# Patient Record
Sex: FEMALE | Race: ASIAN | NOT HISPANIC OR LATINO
[De-identification: names, ages, dates, MRNs, and addresses within clinical notes are randomized per-mention and may not be internally consistent; named-entity substitution may affect disease eponyms.]

---

## 2019-05-06 ENCOUNTER — TRANSCRIPTION ENCOUNTER (OUTPATIENT)
Age: 34
End: 2019-05-06

## 2019-05-07 ENCOUNTER — INPATIENT (INPATIENT)
Facility: HOSPITAL | Age: 34
LOS: 2 days | Discharge: HOME | End: 2019-05-10
Attending: OBSTETRICS & GYNECOLOGY | Admitting: OBSTETRICS & GYNECOLOGY
Payer: COMMERCIAL

## 2019-05-07 VITALS — HEIGHT: 62 IN | WEIGHT: 121.25 LBS

## 2019-05-07 DIAGNOSIS — O26.899 OTHER SPECIFIED PREGNANCY RELATED CONDITIONS, UNSPECIFIED TRIMESTER: ICD-10-CM

## 2019-05-07 DIAGNOSIS — Z3A.00 WEEKS OF GESTATION OF PREGNANCY NOT SPECIFIED: ICD-10-CM

## 2019-05-07 DIAGNOSIS — Z34.80 ENCOUNTER FOR SUPERVISION OF OTHER NORMAL PREGNANCY, UNSPECIFIED TRIMESTER: ICD-10-CM

## 2019-05-07 LAB
BASOPHILS # BLD AUTO: 0.1 K/UL — SIGNIFICANT CHANGE UP (ref 0–0.2)
BASOPHILS NFR BLD AUTO: 0.7 % — SIGNIFICANT CHANGE UP (ref 0–2)
BLD GP AB SCN SERPL QL: NEGATIVE — SIGNIFICANT CHANGE UP
EOSINOPHIL # BLD AUTO: 0.1 K/UL — SIGNIFICANT CHANGE UP (ref 0–0.5)
EOSINOPHIL NFR BLD AUTO: 0.9 % — SIGNIFICANT CHANGE UP (ref 0–6)
HCT VFR BLD CALC: 15.3 % — CRITICAL LOW (ref 34.5–45)
HCT VFR BLD CALC: 35.6 % — SIGNIFICANT CHANGE UP (ref 34.5–45)
HGB BLD-MCNC: 12.4 G/DL — SIGNIFICANT CHANGE UP (ref 11.5–15.5)
HGB BLD-MCNC: 5.3 G/DL — CRITICAL LOW (ref 11.5–15.5)
LYMPHOCYTES # BLD AUTO: 18.5 % — SIGNIFICANT CHANGE UP (ref 13–44)
LYMPHOCYTES # BLD AUTO: 2.1 K/UL — SIGNIFICANT CHANGE UP (ref 1–3.3)
MCHC RBC-ENTMCNC: 29.7 PG — SIGNIFICANT CHANGE UP (ref 27–34)
MCHC RBC-ENTMCNC: 30.1 PG — SIGNIFICANT CHANGE UP (ref 27–34)
MCHC RBC-ENTMCNC: 34.5 GM/DL — SIGNIFICANT CHANGE UP (ref 32–36)
MCHC RBC-ENTMCNC: 34.8 GM/DL — SIGNIFICANT CHANGE UP (ref 32–36)
MCV RBC AUTO: 85.3 FL — SIGNIFICANT CHANGE UP (ref 80–100)
MCV RBC AUTO: 87.3 FL — SIGNIFICANT CHANGE UP (ref 80–100)
MONOCYTES # BLD AUTO: 0.5 K/UL — SIGNIFICANT CHANGE UP (ref 0–0.9)
MONOCYTES NFR BLD AUTO: 4 % — SIGNIFICANT CHANGE UP (ref 2–14)
NEUTROPHILS # BLD AUTO: 8.6 K/UL — HIGH (ref 1.8–7.4)
NEUTROPHILS NFR BLD AUTO: 76 % — SIGNIFICANT CHANGE UP (ref 43–77)
PLATELET # BLD AUTO: 135 K/UL — LOW (ref 150–400)
PLATELET # BLD AUTO: 276 K/UL — SIGNIFICANT CHANGE UP (ref 150–400)
RBC # BLD: 1.75 M/UL — LOW (ref 3.8–5.2)
RBC # BLD: 4.17 M/UL — SIGNIFICANT CHANGE UP (ref 3.8–5.2)
RBC # FLD: 11.5 % — SIGNIFICANT CHANGE UP (ref 10.3–14.5)
RBC # FLD: 11.7 % — SIGNIFICANT CHANGE UP (ref 10.3–14.5)
RH IG SCN BLD-IMP: POSITIVE — SIGNIFICANT CHANGE UP
RH IG SCN BLD-IMP: POSITIVE — SIGNIFICANT CHANGE UP
T PALLIDUM AB TITR SER: NEGATIVE — SIGNIFICANT CHANGE UP
WBC # BLD: 11.3 K/UL — HIGH (ref 3.8–10.5)
WBC # BLD: 7.3 K/UL — SIGNIFICANT CHANGE UP (ref 3.8–10.5)
WBC # FLD AUTO: 11.3 K/UL — HIGH (ref 3.8–10.5)
WBC # FLD AUTO: 7.3 K/UL — SIGNIFICANT CHANGE UP (ref 3.8–10.5)

## 2019-05-07 RX ORDER — LANOLIN
1 OINTMENT (GRAM) TOPICAL EVERY 6 HOURS
Qty: 0 | Refills: 0 | Status: DISCONTINUED | OUTPATIENT
Start: 2019-05-07 | End: 2019-05-10

## 2019-05-07 RX ORDER — GLYCERIN ADULT
1 SUPPOSITORY, RECTAL RECTAL AT BEDTIME
Qty: 0 | Refills: 0 | Status: DISCONTINUED | OUTPATIENT
Start: 2019-05-07 | End: 2019-05-10

## 2019-05-07 RX ORDER — OXYTOCIN 10 UNIT/ML
333.33 VIAL (ML) INJECTION
Qty: 20 | Refills: 0 | Status: DISCONTINUED | OUTPATIENT
Start: 2019-05-07 | End: 2019-05-10

## 2019-05-07 RX ORDER — ONDANSETRON 8 MG/1
4 TABLET, FILM COATED ORAL ONCE
Qty: 0 | Refills: 0 | Status: DISCONTINUED | OUTPATIENT
Start: 2019-05-07 | End: 2019-05-10

## 2019-05-07 RX ORDER — AMPICILLIN TRIHYDRATE 250 MG
CAPSULE ORAL
Qty: 0 | Refills: 0 | Status: DISCONTINUED | OUTPATIENT
Start: 2019-05-07 | End: 2019-05-07

## 2019-05-07 RX ORDER — DIPHENHYDRAMINE HCL 50 MG
25 CAPSULE ORAL EVERY 4 HOURS
Qty: 0 | Refills: 0 | Status: DISCONTINUED | OUTPATIENT
Start: 2019-05-07 | End: 2019-05-08

## 2019-05-07 RX ORDER — SIMETHICONE 80 MG/1
80 TABLET, CHEWABLE ORAL EVERY 4 HOURS
Qty: 0 | Refills: 0 | Status: DISCONTINUED | OUTPATIENT
Start: 2019-05-07 | End: 2019-05-10

## 2019-05-07 RX ORDER — SODIUM CHLORIDE 9 MG/ML
1000 INJECTION, SOLUTION INTRAVENOUS
Qty: 0 | Refills: 0 | Status: DISCONTINUED | OUTPATIENT
Start: 2019-05-07 | End: 2019-05-10

## 2019-05-07 RX ORDER — OXYCODONE HYDROCHLORIDE 5 MG/1
5 TABLET ORAL
Qty: 0 | Refills: 0 | Status: COMPLETED | OUTPATIENT
Start: 2019-05-07 | End: 2019-05-14

## 2019-05-07 RX ORDER — NALOXONE HYDROCHLORIDE 4 MG/.1ML
0.1 SPRAY NASAL
Qty: 0 | Refills: 0 | Status: DISCONTINUED | OUTPATIENT
Start: 2019-05-07 | End: 2019-05-08

## 2019-05-07 RX ORDER — GENTAMICIN SULFATE 40 MG/ML
250 VIAL (ML) INJECTION ONCE
Qty: 0 | Refills: 0 | Status: COMPLETED | OUTPATIENT
Start: 2019-05-07 | End: 2019-05-07

## 2019-05-07 RX ORDER — KETOROLAC TROMETHAMINE 30 MG/ML
30 SYRINGE (ML) INJECTION EVERY 6 HOURS
Qty: 0 | Refills: 0 | Status: DISCONTINUED | OUTPATIENT
Start: 2019-05-07 | End: 2019-05-09

## 2019-05-07 RX ORDER — HEPARIN SODIUM 5000 [USP'U]/ML
5000 INJECTION INTRAVENOUS; SUBCUTANEOUS EVERY 12 HOURS
Qty: 0 | Refills: 0 | Status: DISCONTINUED | OUTPATIENT
Start: 2019-05-08 | End: 2019-05-10

## 2019-05-07 RX ORDER — SODIUM CHLORIDE 9 MG/ML
1000 INJECTION, SOLUTION INTRAVENOUS
Qty: 0 | Refills: 0 | Status: DISCONTINUED | OUTPATIENT
Start: 2019-05-07 | End: 2019-05-07

## 2019-05-07 RX ORDER — FENTANYL/BUPIVACAINE/NS/PF 2MCG/ML-.1
5 PLASTIC BAG, INJECTION (ML) INJECTION
Qty: 0 | Refills: 0 | Status: DISCONTINUED | OUTPATIENT
Start: 2019-05-07 | End: 2019-05-08

## 2019-05-07 RX ORDER — ONDANSETRON 8 MG/1
4 TABLET, FILM COATED ORAL EVERY 6 HOURS
Qty: 0 | Refills: 0 | Status: DISCONTINUED | OUTPATIENT
Start: 2019-05-07 | End: 2019-05-08

## 2019-05-07 RX ORDER — MAGNESIUM HYDROXIDE 400 MG/1
30 TABLET, CHEWABLE ORAL
Qty: 0 | Refills: 0 | Status: DISCONTINUED | OUTPATIENT
Start: 2019-05-07 | End: 2019-05-10

## 2019-05-07 RX ORDER — DOCUSATE SODIUM 100 MG
100 CAPSULE ORAL
Qty: 0 | Refills: 0 | Status: DISCONTINUED | OUTPATIENT
Start: 2019-05-07 | End: 2019-05-08

## 2019-05-07 RX ORDER — OXYCODONE HYDROCHLORIDE 5 MG/1
5 TABLET ORAL ONCE
Qty: 0 | Refills: 0 | Status: DISCONTINUED | OUTPATIENT
Start: 2019-05-07 | End: 2019-05-10

## 2019-05-07 RX ORDER — TETANUS TOXOID, REDUCED DIPHTHERIA TOXOID AND ACELLULAR PERTUSSIS VACCINE, ADSORBED 5; 2.5; 8; 8; 2.5 [IU]/.5ML; [IU]/.5ML; UG/.5ML; UG/.5ML; UG/.5ML
0.5 SUSPENSION INTRAMUSCULAR ONCE
Qty: 0 | Refills: 0 | Status: DISCONTINUED | OUTPATIENT
Start: 2019-05-07 | End: 2019-05-10

## 2019-05-07 RX ORDER — ACETAMINOPHEN 500 MG
975 TABLET ORAL ONCE
Qty: 0 | Refills: 0 | Status: COMPLETED | OUTPATIENT
Start: 2019-05-07 | End: 2019-05-07

## 2019-05-07 RX ORDER — LEVOTHYROXINE SODIUM 125 MCG
75 TABLET ORAL DAILY
Qty: 0 | Refills: 0 | Status: DISCONTINUED | OUTPATIENT
Start: 2019-05-07 | End: 2019-05-08

## 2019-05-07 RX ORDER — IBUPROFEN 200 MG
600 TABLET ORAL EVERY 6 HOURS
Qty: 0 | Refills: 0 | Status: COMPLETED | OUTPATIENT
Start: 2019-05-07 | End: 2020-04-04

## 2019-05-07 RX ORDER — AMPICILLIN TRIHYDRATE 250 MG
2 CAPSULE ORAL ONCE
Qty: 0 | Refills: 0 | Status: COMPLETED | OUTPATIENT
Start: 2019-05-07 | End: 2019-05-07

## 2019-05-07 RX ORDER — ACETAMINOPHEN 500 MG
975 TABLET ORAL EVERY 6 HOURS
Qty: 0 | Refills: 0 | Status: DISCONTINUED | OUTPATIENT
Start: 2019-05-07 | End: 2019-05-10

## 2019-05-07 RX ORDER — CITRIC ACID/SODIUM CITRATE 300-500 MG
15 SOLUTION, ORAL ORAL EVERY 6 HOURS
Qty: 0 | Refills: 0 | Status: DISCONTINUED | OUTPATIENT
Start: 2019-05-07 | End: 2019-05-07

## 2019-05-07 RX ORDER — DEXAMETHASONE 0.5 MG/5ML
4 ELIXIR ORAL EVERY 6 HOURS
Qty: 0 | Refills: 0 | Status: DISCONTINUED | OUTPATIENT
Start: 2019-05-07 | End: 2019-05-08

## 2019-05-07 RX ORDER — FAMOTIDINE 10 MG/ML
20 INJECTION INTRAVENOUS ONCE
Qty: 0 | Refills: 0 | Status: DISCONTINUED | OUTPATIENT
Start: 2019-05-07 | End: 2019-05-08

## 2019-05-07 RX ORDER — DIPHENHYDRAMINE HCL 50 MG
25 CAPSULE ORAL EVERY 6 HOURS
Qty: 0 | Refills: 0 | Status: DISCONTINUED | OUTPATIENT
Start: 2019-05-07 | End: 2019-05-10

## 2019-05-07 RX ORDER — OXYTOCIN 10 UNIT/ML
2 VIAL (ML) INJECTION
Qty: 30 | Refills: 0 | Status: DISCONTINUED | OUTPATIENT
Start: 2019-05-07 | End: 2019-05-10

## 2019-05-07 RX ADMIN — Medication 975 MILLIGRAM(S): at 16:05

## 2019-05-07 RX ADMIN — Medication 216 GRAM(S): at 16:07

## 2019-05-07 RX ADMIN — Medication 300 MILLIGRAM(S): at 18:47

## 2019-05-07 RX ADMIN — Medication 2 MILLIUNIT(S)/MIN: at 10:33

## 2019-05-07 RX ADMIN — SODIUM CHLORIDE 125 MILLILITER(S): 9 INJECTION, SOLUTION INTRAVENOUS at 22:00

## 2019-05-07 NOTE — PROGRESS NOTE ADULT - SUBJECTIVE AND OBJECTIVE BOX
PACU admission temp 38.7, d/w [facility] pain service. Will continue PCEA, will monitor if continues to be febrile will d/c epidural

## 2019-05-07 NOTE — PRE-ANESTHESIA EVALUATION ADULT - NSANTHOSAYNRD_GEN_A_CORE
No. JED screening performed.  STOP BANG Legend: 0-2 = LOW Risk; 3-4 = INTERMEDIATE Risk; 5-8 = HIGH Risk

## 2019-05-08 LAB
BASOPHILS # BLD AUTO: 0 K/UL — SIGNIFICANT CHANGE UP (ref 0–0.2)
BASOPHILS # BLD AUTO: 0 K/UL — SIGNIFICANT CHANGE UP (ref 0–0.2)
BASOPHILS NFR BLD AUTO: 0 % — SIGNIFICANT CHANGE UP (ref 0–2)
CULTURE RESULTS: NO GROWTH — SIGNIFICANT CHANGE UP
EOSINOPHIL # BLD AUTO: 0 K/UL — SIGNIFICANT CHANGE UP (ref 0–0.5)
EOSINOPHIL # BLD AUTO: 0 K/UL — SIGNIFICANT CHANGE UP (ref 0–0.5)
EOSINOPHIL NFR BLD AUTO: 0 % — SIGNIFICANT CHANGE UP (ref 0–6)
HCT VFR BLD CALC: 26.6 % — LOW (ref 34.5–45)
HCT VFR BLD CALC: 27.2 % — LOW (ref 34.5–45)
HGB BLD-MCNC: 8.9 G/DL — LOW (ref 11.5–15.5)
HGB BLD-MCNC: 9.3 G/DL — LOW (ref 11.5–15.5)
LYMPHOCYTES # BLD AUTO: 0.6 K/UL — LOW (ref 1–3.3)
LYMPHOCYTES # BLD AUTO: 0.78 K/UL — LOW (ref 1–3.3)
LYMPHOCYTES # BLD AUTO: 6 % — LOW (ref 13–44)
LYMPHOCYTES # BLD AUTO: 6.7 % — LOW (ref 13–44)
MANUAL SMEAR VERIFICATION: SIGNIFICANT CHANGE UP
MCHC RBC-ENTMCNC: 28.5 PG — SIGNIFICANT CHANGE UP (ref 27–34)
MCHC RBC-ENTMCNC: 29.8 PG — SIGNIFICANT CHANGE UP (ref 27–34)
MCHC RBC-ENTMCNC: 32.7 GM/DL — SIGNIFICANT CHANGE UP (ref 32–36)
MCHC RBC-ENTMCNC: 34.8 GM/DL — SIGNIFICANT CHANGE UP (ref 32–36)
MCV RBC AUTO: 85.6 FL — SIGNIFICANT CHANGE UP (ref 80–100)
MCV RBC AUTO: 87.2 FL — SIGNIFICANT CHANGE UP (ref 80–100)
MONOCYTES # BLD AUTO: 0.4 K/UL — SIGNIFICANT CHANGE UP (ref 0–0.9)
MONOCYTES # BLD AUTO: 0.4 K/UL — SIGNIFICANT CHANGE UP (ref 0–0.9)
MONOCYTES NFR BLD AUTO: 3 % — SIGNIFICANT CHANGE UP (ref 2–14)
MONOCYTES NFR BLD AUTO: 3.4 % — SIGNIFICANT CHANGE UP (ref 2–14)
NEUTROPHILS # BLD AUTO: 10.52 K/UL — HIGH (ref 1.8–7.4)
NEUTROPHILS # BLD AUTO: 6.1 K/UL — SIGNIFICANT CHANGE UP (ref 1.8–7.4)
NEUTROPHILS NFR BLD AUTO: 89.1 % — HIGH (ref 43–77)
NEUTROPHILS NFR BLD AUTO: 91 % — HIGH (ref 43–77)
NEUTS BAND # BLD: 0.8 % — SIGNIFICANT CHANGE UP (ref 0–8)
PLAT MORPH BLD: NORMAL — SIGNIFICANT CHANGE UP
PLAT MORPH BLD: NORMAL — SIGNIFICANT CHANGE UP
PLATELET # BLD AUTO: 207 K/UL — SIGNIFICANT CHANGE UP (ref 150–400)
PLATELET # BLD AUTO: 218 K/UL — SIGNIFICANT CHANGE UP (ref 150–400)
RBC # BLD: 3.11 M/UL — LOW (ref 3.8–5.2)
RBC # BLD: 3.12 M/UL — LOW (ref 3.8–5.2)
RBC # FLD: 11.7 % — SIGNIFICANT CHANGE UP (ref 10.3–14.5)
RBC # FLD: 12.8 % — SIGNIFICANT CHANGE UP (ref 10.3–14.5)
RBC BLD AUTO: NORMAL — SIGNIFICANT CHANGE UP
RBC BLD AUTO: SIGNIFICANT CHANGE UP
SMUDGE CELLS # BLD: PRESENT — SIGNIFICANT CHANGE UP
SPECIMEN SOURCE: SIGNIFICANT CHANGE UP
WBC # BLD: 11.7 K/UL — HIGH (ref 3.8–10.5)
WBC # BLD: 12.2 K/UL — HIGH (ref 3.8–10.5)
WBC # FLD AUTO: 11.7 K/UL — HIGH (ref 3.8–10.5)
WBC # FLD AUTO: 12.2 K/UL — HIGH (ref 3.8–10.5)

## 2019-05-08 RX ORDER — ACETAMINOPHEN 500 MG
1000 TABLET ORAL ONCE
Qty: 0 | Refills: 0 | Status: COMPLETED | OUTPATIENT
Start: 2019-05-08 | End: 2019-05-08

## 2019-05-08 RX ORDER — FERROUS SULFATE 325(65) MG
325 TABLET ORAL THREE TIMES A DAY
Qty: 0 | Refills: 0 | Status: DISCONTINUED | OUTPATIENT
Start: 2019-05-08 | End: 2019-05-10

## 2019-05-08 RX ORDER — ASCORBIC ACID 60 MG
500 TABLET,CHEWABLE ORAL DAILY
Qty: 0 | Refills: 0 | Status: DISCONTINUED | OUTPATIENT
Start: 2019-05-08 | End: 2019-05-10

## 2019-05-08 RX ORDER — OXYCODONE HYDROCHLORIDE 5 MG/1
5 TABLET ORAL ONCE
Qty: 0 | Refills: 0 | Status: DISCONTINUED | OUTPATIENT
Start: 2019-05-08 | End: 2019-05-10

## 2019-05-08 RX ORDER — DOCUSATE SODIUM 100 MG
100 CAPSULE ORAL
Qty: 0 | Refills: 0 | Status: DISCONTINUED | OUTPATIENT
Start: 2019-05-08 | End: 2019-05-10

## 2019-05-08 RX ORDER — OXYCODONE HYDROCHLORIDE 5 MG/1
5 TABLET ORAL
Qty: 0 | Refills: 0 | Status: DISCONTINUED | OUTPATIENT
Start: 2019-05-08 | End: 2019-05-10

## 2019-05-08 RX ORDER — FAMOTIDINE 10 MG/ML
20 INJECTION INTRAVENOUS ONCE
Qty: 0 | Refills: 0 | Status: DISCONTINUED | OUTPATIENT
Start: 2019-05-08 | End: 2019-05-10

## 2019-05-08 RX ADMIN — SIMETHICONE 80 MILLIGRAM(S): 80 TABLET, CHEWABLE ORAL at 09:52

## 2019-05-08 RX ADMIN — Medication 400 MILLIGRAM(S): at 16:25

## 2019-05-08 RX ADMIN — Medication 30 MILLIGRAM(S): at 11:30

## 2019-05-08 RX ADMIN — Medication 1000 MILLIGRAM(S): at 09:50

## 2019-05-08 RX ADMIN — HEPARIN SODIUM 5000 UNIT(S): 5000 INJECTION INTRAVENOUS; SUBCUTANEOUS at 00:59

## 2019-05-08 RX ADMIN — Medication 100 MILLIGRAM(S): at 00:59

## 2019-05-08 RX ADMIN — Medication 30 MILLIGRAM(S): at 10:53

## 2019-05-08 RX ADMIN — HEPARIN SODIUM 5000 UNIT(S): 5000 INJECTION INTRAVENOUS; SUBCUTANEOUS at 12:35

## 2019-05-08 RX ADMIN — Medication 1000 MILLIGRAM(S): at 17:00

## 2019-05-08 RX ADMIN — SIMETHICONE 80 MILLIGRAM(S): 80 TABLET, CHEWABLE ORAL at 21:25

## 2019-05-08 RX ADMIN — Medication 100 MILLIGRAM(S): at 17:13

## 2019-05-08 RX ADMIN — Medication 100 MILLIGRAM(S): at 08:36

## 2019-05-08 RX ADMIN — Medication 975 MILLIGRAM(S): at 02:00

## 2019-05-08 RX ADMIN — Medication 975 MILLIGRAM(S): at 23:05

## 2019-05-08 RX ADMIN — Medication 975 MILLIGRAM(S): at 22:33

## 2019-05-08 RX ADMIN — Medication 975 MILLIGRAM(S): at 01:00

## 2019-05-08 RX ADMIN — Medication 400 MILLIGRAM(S): at 09:11

## 2019-05-08 NOTE — PROGRESS NOTE ADULT - SUBJECTIVE AND OBJECTIVE BOX
Postpartum Note-  Section POD#1    Allergies: No Known Allergies    Intolerances: Denies    Blood Type: B  Positive  Rubella: Immune  RPR: Nonreactive    S: Patient is a  33y  POD#1 S/P primary c/s for arrest of dilation  Patient w/o complaints, pain is controlled.  Pt is OOB, tolerating PO, passing flatus. Lochia WNL.   Feeding: Breast and bottle feeding    O:  Vital Signs Last 24 Hrs  T(C): 36.7 (08 May 2019 05:00), Max: 38.7 (07 May 2019 18:35)  T(F): 98.1 (08 May 2019 05:00), Max: 101.7 (07 May 2019 18:35)  HR: 85 (08 May 2019 05:00) (85 - 102)  BP: 93/60 (08 May 2019 05:00) (86/54 - 112/64)  BP(mean): 65 (07 May 2019 21:35) (65 - 76)  RR: 18 (08 May 2019 05:00) (12 - 33)  SpO2: 95% (08 May 2019 05:00) (91% - 99%)    I&O's Summary    07 May 2019 07:01  -  08 May 2019 06:49  --------------------------------------------------------  IN: 4500 mL / OUT: 4325 mL / NET: 175 mL        Gen: NAD  CV: rrr s1s2, CTABL  Abdomen: Soft, nontender, non-distended, fundus firm.  Bowel sounds x 4 quadrants  Incision: Clean, dry, and intact. Dressing removed. Negative erythema/edema/ecchymosis   Sub Q  Lochia WNL  Ext: PAS in place. Negative Homans B/L.  Pedal pulses palpated B/L    LABS:                          9.3    12.2  )-----------( 207      ( 08 May 2019 00:15 )             26.6       A/P:  33y  POD # 1 S/P primary c/s for arrest of dilation. Patient is doing well.    PMHx:  PAST MEDICAL & SURGICAL HISTORY:    Current Issues:    Increase OOB  Renew IVF  Renew PCEA  DVT ppx  Dressing removed  Due to void  Regular diet  AM CBC  Routine Postpartum/Post-op care Postpartum Note-  Section POD#1    Allergies: No Known Allergies    Intolerances: Denies    Blood Type: B  Positive  Rubella: Immune  RPR: Nonreactive    S: Patient is a  33y  POD#1 S/P primary c/s for arrest of dilation  Patient w/o complaints, pain is controlled.  Pt is OOB, tolerating PO, passing flatus. Lochia WNL.   Feeding: Breast and bottle feeding    O:  Vital Signs Last 24 Hrs  T(C): 36.7 (08 May 2019 05:00), Max: 38.7 (07 May 2019 18:35)  T(F): 98.1 (08 May 2019 05:00), Max: 101.7 (07 May 2019 18:35)  HR: 85 (08 May 2019 05:00) (85 - 102)  BP: 93/60 (08 May 2019 05:00) (86/54 - 112/64)  BP(mean): 65 (07 May 2019 21:35) (65 - 76)  RR: 18 (08 May 2019 05:00) (12 - 33)  SpO2: 95% (08 May 2019 05:00) (91% - 99%)    I&O's Summary    07 May 2019 07:01  -  08 May 2019 06:49  --------------------------------------------------------  IN: 4500 mL / OUT: 4325 mL / NET: 175 mL        Gen: NAD  CV: rrr s1s2, CTABL  Abdomen: Soft, nontender, non-distended, fundus firm.  Bowel sounds x 4 quadrants  Incision: Clean, dry, and intact. Dressing removed. Negative erythema/edema/ecchymosis   Sub Q  Lochia WNL  Ext: PAS in place. Negative Homans B/L.  Pedal pulses palpated B/L    LABS:                          9.3    12.2  )-----------( 207      ( 08 May 2019 00:15 )             26.6       A/P:  33y  POD # 1 S/P primary c/s for arrest of dilation. Patient is doing well.    PMHx: Hypothyroidism  PAST MEDICAL & SURGICAL HISTORY: Hypothyroidism    Increase OOB  Renew IVF  Renew PCEA  DVT ppx  Dressing removed  Due to void  Regular diet  AM CBC  Routine Postpartum/Post-op care

## 2019-05-08 NOTE — PROGRESS NOTE ADULT - SUBJECTIVE AND OBJECTIVE BOX
Day 1 of Anesthesia Pain Management Service    SUBJECTIVE: I'm okay  Pain Scale Score:    [X] Refer to charted pain scores    THERAPY: Epidural Bupivacaine 0.01 % and Fentanyl 3 micrograms/mL     Demand Dose: 3 mL  Lockout: 15 minutes   Continuous Rate:  10 mL    MEDICATIONS  (STANDING):  acetaminophen   Tablet .. 975 milliGRAM(s) Oral every 6 hours  acetaminophen  IVPB .. 1000 milliGRAM(s) IV Intermittent once  clindamycin IVPB 900 milliGRAM(s) IV Intermittent every 8 hours  clindamycin IVPB      diphtheria/tetanus/pertussis (acellular) Vaccine (ADAcel) 0.5 milliLiter(s) IntraMuscular once  famotidine Injectable 20 milliGRAM(s) IV Push once  famotidine Injectable 20 milliGRAM(s) IV Push once  heparin  Injectable 5000 Unit(s) SubCutaneous every 12 hours  ibuprofen  Tablet. 600 milliGRAM(s) Oral every 6 hours  ketorolac   Injectable 30 milliGRAM(s) IV Push every 6 hours  lactated ringers. 1000 milliLiter(s) (125 mL/Hr) IV Continuous <Continuous>  ondansetron Injectable 4 milliGRAM(s) IV Push once  oxytocin Infusion 333.333 milliUNIT(s)/Min (1000 mL/Hr) IV Continuous <Continuous>  oxytocin Infusion 2 milliUNIT(s)/Min (2 mL/Hr) IV Continuous <Continuous>  oxytocin Infusion 333.333 milliUNIT(s)/Min (1000 mL/Hr) IV Continuous <Continuous>  terbutaline  Injectable 0.25 milliGRAM(s) SubCutaneous once    MEDICATIONS  (PRN):  diphenhydrAMINE 25 milliGRAM(s) Oral every 6 hours PRN Itching  docusate sodium 100 milliGRAM(s) Oral two times a day PRN Stool softening  glycerin Suppository - Adult 1 Suppository(s) Rectal at bedtime PRN Constipation  lanolin Ointment 1 Application(s) Topical every 6 hours PRN Sore Nipples  magnesium hydroxide Suspension 30 milliLiter(s) Oral two times a day PRN Constipation  oxyCODONE    IR 5 milliGRAM(s) Oral once PRN Severe Pain (7 - 10)  oxyCODONE    IR 5 milliGRAM(s) Oral every 3 hours PRN Moderate Pain (4 - 6)  oxyCODONE    IR 5 milliGRAM(s) Oral once PRN Severe Pain (7 - 10)  simethicone 80 milliGRAM(s) Chew every 4 hours PRN Gas      OBJECTIVE:    Assessment of Epidural Catheter Site: 	    [ ] Dressing intact	[X] Site non-tender	[X] Site without erythema, discharge, edema  [ ] Epidural tubing and connection checked	[X] Gross neurological exam within normal limits  [X] Catheter removed                          8.9    11.70 )-----------( 218      ( 08 May 2019 08:53 )             27.2     Vital Signs Last 24 Hrs  T(C): 36.7 (05-08-19 @ 09:45), Max: 38.7 (05-07-19 @ 18:35)  T(F): 98 (05-08-19 @ 09:45), Max: 101.7 (05-07-19 @ 18:35)  HR: 83 (05-08-19 @ 09:45) (83 - 102)  BP: 96/62 (05-08-19 @ 09:45) (85/56 - 103/66)  BP(mean): 65 (05-07-19 @ 21:35) (65 - 76)  RR: 16 (05-08-19 @ 09:45) (12 - 33)  SpO2: 97% (05-08-19 @ 09:45) (91% - 99%)      Sedation Score:	[X] Alert	[ ] Drowsy	[ ] Arousable  [ ] Asleep  [ ] Unresponsive    Side Effects:	[X] None	[ ] Nausea	[ ] Vomiting  [ ] Pruritus  		[ ] Weakness  [ ] Numbness  [ ] Other:    ASSESSMENT/ PLAN:    Therapy:                         [ ] Continue   [X] Discontinue   [X] Change to PRN Analgesics    Documentation and Verification of current medications:  [X] Done	[ ] Not done, not eligible, reason:    Comments:

## 2019-05-08 NOTE — PROGRESS NOTE ADULT - SUBJECTIVE AND OBJECTIVE BOX
Pain Management Attending Addendum    SUBJECTIVE: no complaints    Therapy:	  [ ] IV PCA	   [x ] Epidural           [ ] s/p Spinal Opoid              [ ] Postpartum infusion	  [ ] Patient controlled regional anesthesia (PCRA)    [ ] prn Analgesics    OBJECTIVE:   [ x] No new signs     [ ] Other:    Side Effects:  [x ] None			[ ] Other:    Assessment of Catheter Site:		[x ] Intact		[ ] Other:    ASSESSMENT/PLAN  [ ] Continue current therapy    [ x] Therapy changed to:    [ ] IV PCA       [ ] Epidural     [x ] prn Analgesics     [ ] post partum infusion    Comments:

## 2019-05-08 NOTE — CHART NOTE - NSCHARTNOTEFT_GEN_A_CORE
PA Note  Called for SOB and chest pressure x 2h. Pt hasn't ambulated since 5am. No flatus. Received IV Tylenol for abdominal pain. +VOID    VS  97/45  HR 87  O2 97%  T 98  GEN:comfortable, lying upright in bed  Heart RRR  Lungs CTA b/l  Abd softly distended, appropriately tender  INC C/D/I with steris  EXT +PAS, NT, neg homans               8.9    11.70 )-----------( 218      ( 05-08 @ 08:53 )             27.2                9.3    12.2  )-----------( 207      ( 05-08 @ 00:15 )             26.6                5.3    7.3   )-----------( 135      ( 05-07 @ 23:05 )             15.3                12.4   11.3  )-----------( 276      ( 05-07 @ 08:08 )             35.6         POD#1, s/p pltcs with EBL 1L with SOB and chest pressure  -IV Tylenol  -Pepcid  -Mylicon  -Abdominal binder  -continue to closely monitor  Dr James aware  Rosalee Joshi PA-C

## 2019-05-08 NOTE — PROGRESS NOTE ADULT - SUBJECTIVE AND OBJECTIVE BOX
Pt seen and examined at bedside. Pt states mild abdominal pain. Pt x[ ] ambulating, tolerating _reg__ diet, [ ] flatus, [ ]BM, [x ] urinating adequately.   Pt denies fever, chills, chest pain, SOB, nausea, vomiting, lightheadedness, dizziness.      T(F): 98 (05-08-19 @ 09:45), Max: 101.7 (05-07-19 @ 18:35)  HR: 83 (05-08-19 @ 09:45) (83 - 102)  BP: 96/62 (05-08-19 @ 09:45) (85/56 - 103/66)  RR: 16 (05-08-19 @ 09:45) (12 - 33)  SpO2: 97% (05-08-19 @ 09:45) (91% - 99%)  Wt(kg): --  I&O's Summary    07 May 2019 07:01  -  08 May 2019 07:00  --------------------------------------------------------  IN: 4500 mL / OUT: 4325 mL / NET: 175 mL        MEDICATIONS  (STANDING):  acetaminophen   Tablet .. 975 milliGRAM(s) Oral every 6 hours  acetaminophen  IVPB .. 1000 milliGRAM(s) IV Intermittent once  clindamycin IVPB 900 milliGRAM(s) IV Intermittent every 8 hours  clindamycin IVPB      diphtheria/tetanus/pertussis (acellular) Vaccine (ADAcel) 0.5 milliLiter(s) IntraMuscular once  famotidine Injectable 20 milliGRAM(s) IV Push once  heparin  Injectable 5000 Unit(s) SubCutaneous every 12 hours  ibuprofen  Tablet. 600 milliGRAM(s) Oral every 6 hours  ketorolac   Injectable 30 milliGRAM(s) IV Push every 6 hours  lactated ringers. 1000 milliLiter(s) (125 mL/Hr) IV Continuous <Continuous>  ondansetron Injectable 4 milliGRAM(s) IV Push once  oxytocin Infusion 333.333 milliUNIT(s)/Min (1000 mL/Hr) IV Continuous <Continuous>  oxytocin Infusion 2 milliUNIT(s)/Min (2 mL/Hr) IV Continuous <Continuous>  oxytocin Infusion 333.333 milliUNIT(s)/Min (1000 mL/Hr) IV Continuous <Continuous>  terbutaline  Injectable 0.25 milliGRAM(s) SubCutaneous once    MEDICATIONS  (PRN):  diphenhydrAMINE 25 milliGRAM(s) Oral every 6 hours PRN Itching  docusate sodium 100 milliGRAM(s) Oral two times a day PRN Stool softening  glycerin Suppository - Adult 1 Suppository(s) Rectal at bedtime PRN Constipation  lanolin Ointment 1 Application(s) Topical every 6 hours PRN Sore Nipples  magnesium hydroxide Suspension 30 milliLiter(s) Oral two times a day PRN Constipation  oxyCODONE    IR 5 milliGRAM(s) Oral once PRN Severe Pain (7 - 10)  oxyCODONE    IR 5 milliGRAM(s) Oral every 3 hours PRN Moderate Pain (4 - 6)  oxyCODONE    IR 5 milliGRAM(s) Oral once PRN Severe Pain (7 - 10)  simethicone 80 milliGRAM(s) Chew every 4 hours PRN Gas      Physical Exam:  Constitutional: NAD  Pulmonary: clear to auscultation bilaterally   Cardiovascular: Regular rate and rhythm   Abdomen: incision site clean, dry, intact. Soft, mildly tender, [ ] distended, no guarding, no rebound, [ ] bowel sounds  Extremities: no lower extremity edema or calve tenderness. SCDs in place     LABS:                        8.9    11.70 )-----------( 218      ( 08 May 2019 08:53 )             27.2     H/H stable  Notes gas pain - advised  Evaluation of chest shoulder discomfort - unremarkable and related to gas  Afebrile - to D/C antibiotics after 24 hrs            RADIOLOGY & ADDITIONAL TESTS:

## 2019-05-08 NOTE — PROVIDER CONTACT NOTE (OTHER) - ACTION/TREATMENT ORDERED:
NI Humphries in to assess pt. Pt given Mylicon. Encouraged to move and take deep breaths. to continue to observe.

## 2019-05-08 NOTE — CHART NOTE - NSCHARTNOTEFT_GEN_A_CORE
SAVANNAH DAN Postpartum    POD#1      Vital Signs Last 24 Hrs  T(C): 36.8 (08 May 2019 13:37), Max: 38.7 (07 May 2019 18:35)  T(F): 98.2 (08 May 2019 13:37), Max: 101.7 (07 May 2019 18:35)  HR: 96 (08 May 2019 13:37) (83 - 102)  BP: 98/67 (08 May 2019 13:37) (85/56 - 103/66)  BP(mean): 65 (07 May 2019 21:35) (65 - 76)  RR: 18 (08 May 2019 13:37) (12 - 33)  SpO2: 98% (08 May 2019 13:37) (91% - 99%)                          8.9    11.70 )-----------( 218      ( 08 May 2019 08:53 )             27.2   baso 0.0    eos 0.0    imm gran x      lymph 6.7    mono 3.4    poly 89.1                         9.3    12.2  )-----------( 207      ( 08 May 2019 00:15 )             26.6   baso x      eos x      imm gran x      lymph x      mono x      poly x                            5.3    7.3   )-----------( 135      ( 07 May 2019 23:05 )             15.3   baso x      eos x      imm gran x      lymph 6.0    mono 3.0    poly 91.0                         12.4   11.3  )-----------( 276      ( 07 May 2019 08:08 )             35.6   baso 0.7    eos 0.9    imm gran x      lymph 18.5   mono 4.0    poly 76.0         Plan: Anemia secondary to acute blood loss due to delivery.   - Ferrous Sulfate, Colace, Vitamin C supplementation.  - Repeat CBC POD#3.  - Monitor for signs/symptoms of anemia.  No transfusion needed at this time.  NI Bucio

## 2019-05-09 ENCOUNTER — TRANSCRIPTION ENCOUNTER (OUTPATIENT)
Age: 34
End: 2019-05-09

## 2019-05-09 RX ORDER — IBUPROFEN 200 MG
600 TABLET ORAL EVERY 6 HOURS
Refills: 0 | Status: DISCONTINUED | OUTPATIENT
Start: 2019-05-09 | End: 2019-05-10

## 2019-05-09 RX ADMIN — Medication 100 MILLIGRAM(S): at 08:25

## 2019-05-09 RX ADMIN — HEPARIN SODIUM 5000 UNIT(S): 5000 INJECTION INTRAVENOUS; SUBCUTANEOUS at 02:14

## 2019-05-09 RX ADMIN — Medication 325 MILLIGRAM(S): at 13:53

## 2019-05-09 RX ADMIN — Medication 600 MILLIGRAM(S): at 02:45

## 2019-05-09 RX ADMIN — HEPARIN SODIUM 5000 UNIT(S): 5000 INJECTION INTRAVENOUS; SUBCUTANEOUS at 13:53

## 2019-05-09 RX ADMIN — Medication 600 MILLIGRAM(S): at 23:30

## 2019-05-09 RX ADMIN — Medication 325 MILLIGRAM(S): at 22:32

## 2019-05-09 RX ADMIN — Medication 975 MILLIGRAM(S): at 11:10

## 2019-05-09 RX ADMIN — Medication 975 MILLIGRAM(S): at 11:45

## 2019-05-09 RX ADMIN — Medication 600 MILLIGRAM(S): at 02:15

## 2019-05-09 RX ADMIN — Medication 975 MILLIGRAM(S): at 17:54

## 2019-05-09 RX ADMIN — Medication 600 MILLIGRAM(S): at 13:53

## 2019-05-09 RX ADMIN — Medication 600 MILLIGRAM(S): at 08:55

## 2019-05-09 RX ADMIN — Medication 100 MILLIGRAM(S): at 17:53

## 2019-05-09 RX ADMIN — Medication 975 MILLIGRAM(S): at 18:25

## 2019-05-09 RX ADMIN — Medication 600 MILLIGRAM(S): at 08:25

## 2019-05-09 RX ADMIN — Medication 600 MILLIGRAM(S): at 14:46

## 2019-05-09 RX ADMIN — Medication 600 MILLIGRAM(S): at 22:32

## 2019-05-09 RX ADMIN — Medication 325 MILLIGRAM(S): at 08:25

## 2019-05-09 RX ADMIN — Medication 500 MILLIGRAM(S): at 11:10

## 2019-05-09 NOTE — PROGRESS NOTE ADULT - SUBJECTIVE AND OBJECTIVE BOX
Pt seen and examined at bedside. Pt states mild abdominal pain. Pt [x ] ambulating, tolerating _reg__ diet, [ ] flatus, [ ]BM, [x ] urinating adequately.   Pt denies fever, chills, chest pain, SOB, nausea, vomiting, lightheadedness, dizziness.      T(F): 98 (05-09-19 @ 05:00), Max: 98.5 (05-08-19 @ 21:52)  HR: 88 (05-09-19 @ 05:00) (83 - 96)  BP: 96/55 (05-09-19 @ 05:00) (90/63 - 98/67)  RR: 18 (05-09-19 @ 05:00) (16 - 18)  SpO2: 97% (05-09-19 @ 05:00) (97% - 98%)  Wt(kg): --  I&O's Summary      MEDICATIONS  (STANDING):  acetaminophen   Tablet .. 975 milliGRAM(s) Oral every 6 hours  ascorbic acid 500 milliGRAM(s) Oral daily  diphtheria/tetanus/pertussis (acellular) Vaccine (ADAcel) 0.5 milliLiter(s) IntraMuscular once  docusate sodium 100 milliGRAM(s) Oral two times a day  famotidine Injectable 20 milliGRAM(s) IV Push once  ferrous    sulfate 325 milliGRAM(s) Oral three times a day  heparin  Injectable 5000 Unit(s) SubCutaneous every 12 hours  ibuprofen  Tablet. 600 milliGRAM(s) Oral every 6 hours  ketorolac   Injectable 30 milliGRAM(s) IV Push every 6 hours  lactated ringers. 1000 milliLiter(s) (125 mL/Hr) IV Continuous <Continuous>  ondansetron Injectable 4 milliGRAM(s) IV Push once  oxytocin Infusion 333.333 milliUNIT(s)/Min (1000 mL/Hr) IV Continuous <Continuous>  oxytocin Infusion 2 milliUNIT(s)/Min (2 mL/Hr) IV Continuous <Continuous>  oxytocin Infusion 333.333 milliUNIT(s)/Min (1000 mL/Hr) IV Continuous <Continuous>  terbutaline  Injectable 0.25 milliGRAM(s) SubCutaneous once    MEDICATIONS  (PRN):  diphenhydrAMINE 25 milliGRAM(s) Oral every 6 hours PRN Itching  glycerin Suppository - Adult 1 Suppository(s) Rectal at bedtime PRN Constipation  lanolin Ointment 1 Application(s) Topical every 6 hours PRN Sore Nipples  magnesium hydroxide Suspension 30 milliLiter(s) Oral two times a day PRN Constipation  oxyCODONE    IR 5 milliGRAM(s) Oral once PRN Severe Pain (7 - 10)  oxyCODONE    IR 5 milliGRAM(s) Oral every 3 hours PRN Moderate Pain (4 - 6)  oxyCODONE    IR 5 milliGRAM(s) Oral once PRN Severe Pain (7 - 10)  simethicone 80 milliGRAM(s) Chew every 4 hours PRN Gas      Physical Exam:  Constitutional: NAD  Pulmonary: clear to auscultation bilaterally   Cardiovascular: Regular rate and rhythm   Abdomen: incision site clean, dry, intact. Soft, mildly tender, [ ] distended, no guarding, no rebound, [ ] bowel sounds  Extremities: no lower extremity edema or calve tenderness. SCDs in place     LABS:                        8.9    11.70 )-----------( 218      ( 08 May 2019 08:53 )             27.2                 RADIOLOGY & ADDITIONAL TESTS:

## 2019-05-09 NOTE — PROGRESS NOTE ADULT - SUBJECTIVE AND OBJECTIVE BOX
Postpartum Note-  Section POD#2    Allergies: No Known Allergies    Intolerances: Denies    Rubella: Immune  RPR: Nonreactive  Blood Type: B positive    S:Patient is a  33y  POD#2 S/P primary c/s for arrest of dilation.  Subjective: Patient w/o complaints, pain is controlled.  Pt is OOB, tolerating PO, passing flatus, and voiding. Lochia WNL.   Feeding: Breast feeding    O:  Vital Signs Last 24 Hrs  T(C): 36.7 (09 May 2019 05:00), Max: 36.9 (08 May 2019 08:57)  T(F): 98 (09 May 2019 05:00), Max: 98.5 (08 May 2019 21:52)  HR: 88 (09 May 2019 05:00) (83 - 96)  BP: 96/55 (09 May 2019 05:00) (85/56 - 98/67)  RR: 18 (09 May 2019 05:00) (16 - 18)  SpO2: 97% (09 May 2019 05:00) (95% - 98%)      Gen: NAD  CV: rrr s1s2, CTABL  Abdomen: Soft, nontender, non distened, fundus firm.  Bowel Sounds x 4 quadrants  Incision: Clean, dry, and intact.  Negative erythema/edema/ecchymosis.   SubQ  Lochia WNL  Ext: Neg edema, Neg calf tenderness.  Pedal pulses palpated B/L    LABS:                          8.9    11.70 )-----------( 218      ( 08 May 2019 08:53 )             27.2       A/P:  33y  POD # 2 S/P  repeat/ primary  section, doing well    PMHx: Hypothyroidism  Current Issues: Hypothyroidism    Increase OOB  D/C IVF  D/C PCEA  PO Pain Protocol  Continue Regular Diet  Continue Routine Postop/Postpartum Care Postpartum Note-  Section POD#2    Allergies: No Known Allergies    Intolerances: Denies    Rubella: Immune  RPR: Nonreactive  Blood Type: B positive    S:Patient is a  33y  POD#2 S/P primary c/s for arrest of dilation.  Subjective: Patient w/o complaints, pain is controlled.  Pt is OOB, tolerating PO, passing flatus, and voiding. Lochia WNL.   Feeding: Breast feeding    O:  Vital Signs Last 24 Hrs  T(C): 36.7 (09 May 2019 05:00), Max: 36.9 (08 May 2019 08:57)  T(F): 98 (09 May 2019 05:00), Max: 98.5 (08 May 2019 21:52)  HR: 88 (09 May 2019 05:00) (83 - 96)  BP: 96/55 (09 May 2019 05:00) (85/56 - 98/67)  RR: 18 (09 May 2019 05:00) (16 - 18)  SpO2: 97% (09 May 2019 05:00) (95% - 98%)      Gen: NAD  CV: rrr s1s2, CTABL  Abdomen: Soft, nontender, non distened, fundus firm.  Bowel Sounds x 4 quadrants  Incision: Clean, dry, and intact.  Negative erythema/edema/ecchymosis.   SubQ  Lochia WNL  Ext: Neg edema, Neg calf tenderness.  Pedal pulses palpated B/L    LABS:                          8.9    11.70 )-----------( 218      ( 08 May 2019 08:53 )             27.2       A/P:  33y  POD # 2 S/P primary  section, doing well    PMHx: Hypothyroidism  Current Issues: Hypothyroidism    Increase OOB  D/C IVF  D/C PCEA  PO Pain Protocol  Continue Regular Diet  Continue Routine Postop/Postpartum Care

## 2019-05-10 VITALS
SYSTOLIC BLOOD PRESSURE: 93 MMHG | OXYGEN SATURATION: 97 % | RESPIRATION RATE: 18 BRPM | TEMPERATURE: 98 F | DIASTOLIC BLOOD PRESSURE: 57 MMHG | HEART RATE: 75 BPM

## 2019-05-10 DIAGNOSIS — O41.1290 CHORIOAMNIONITIS, UNSPECIFIED TRIMESTER, NOT APPLICABLE OR UNSPECIFIED: ICD-10-CM

## 2019-05-10 LAB
BASOPHILS # BLD AUTO: 0.01 K/UL — SIGNIFICANT CHANGE UP (ref 0–0.2)
BASOPHILS NFR BLD AUTO: 0.1 % — SIGNIFICANT CHANGE UP (ref 0–2)
EOSINOPHIL # BLD AUTO: 0.23 K/UL — SIGNIFICANT CHANGE UP (ref 0–0.5)
EOSINOPHIL NFR BLD AUTO: 2.8 % — SIGNIFICANT CHANGE UP (ref 0–6)
HCT VFR BLD CALC: 25 % — LOW (ref 34.5–45)
HGB BLD-MCNC: 8.3 G/DL — LOW (ref 11.5–15.5)
IMM GRANULOCYTES NFR BLD AUTO: 0.7 % — SIGNIFICANT CHANGE UP (ref 0–1.5)
LYMPHOCYTES # BLD AUTO: 1.97 K/UL — SIGNIFICANT CHANGE UP (ref 1–3.3)
LYMPHOCYTES # BLD AUTO: 23.7 % — SIGNIFICANT CHANGE UP (ref 13–44)
MCHC RBC-ENTMCNC: 28.2 PG — SIGNIFICANT CHANGE UP (ref 27–34)
MCHC RBC-ENTMCNC: 33.2 GM/DL — SIGNIFICANT CHANGE UP (ref 32–36)
MCV RBC AUTO: 85 FL — SIGNIFICANT CHANGE UP (ref 80–100)
MONOCYTES # BLD AUTO: 0.36 K/UL — SIGNIFICANT CHANGE UP (ref 0–0.9)
MONOCYTES NFR BLD AUTO: 4.3 % — SIGNIFICANT CHANGE UP (ref 2–14)
NEUTROPHILS # BLD AUTO: 5.68 K/UL — SIGNIFICANT CHANGE UP (ref 1.8–7.4)
NEUTROPHILS NFR BLD AUTO: 68.4 % — SIGNIFICANT CHANGE UP (ref 43–77)
PLATELET # BLD AUTO: 252 K/UL — SIGNIFICANT CHANGE UP (ref 150–400)
RBC # BLD: 2.94 M/UL — LOW (ref 3.8–5.2)
RBC # FLD: 13.1 % — SIGNIFICANT CHANGE UP (ref 10.3–14.5)
WBC # BLD: 8.31 K/UL — SIGNIFICANT CHANGE UP (ref 3.8–10.5)
WBC # FLD AUTO: 8.31 K/UL — SIGNIFICANT CHANGE UP (ref 3.8–10.5)

## 2019-05-10 PROCEDURE — 85027 COMPLETE CBC AUTOMATED: CPT

## 2019-05-10 PROCEDURE — 59025 FETAL NON-STRESS TEST: CPT

## 2019-05-10 PROCEDURE — 86900 BLOOD TYPING SEROLOGIC ABO: CPT

## 2019-05-10 PROCEDURE — C1889: CPT

## 2019-05-10 PROCEDURE — 86850 RBC ANTIBODY SCREEN: CPT

## 2019-05-10 PROCEDURE — 87086 URINE CULTURE/COLONY COUNT: CPT

## 2019-05-10 PROCEDURE — 86901 BLOOD TYPING SEROLOGIC RH(D): CPT

## 2019-05-10 PROCEDURE — G0463: CPT

## 2019-05-10 PROCEDURE — 59050 FETAL MONITOR W/REPORT: CPT

## 2019-05-10 PROCEDURE — 86780 TREPONEMA PALLIDUM: CPT

## 2019-05-10 PROCEDURE — 87040 BLOOD CULTURE FOR BACTERIA: CPT

## 2019-05-10 RX ORDER — SIMETHICONE 80 MG/1
1 TABLET, CHEWABLE ORAL
Qty: 0 | Refills: 0 | DISCHARGE
Start: 2019-05-10

## 2019-05-10 RX ORDER — OXYCODONE HYDROCHLORIDE 5 MG/1
1 TABLET ORAL
Qty: 30 | Refills: 0
Start: 2019-05-10

## 2019-05-10 RX ORDER — LEVOTHYROXINE SODIUM 125 MCG
1 TABLET ORAL
Qty: 0 | Refills: 0 | DISCHARGE

## 2019-05-10 RX ORDER — IBUPROFEN 200 MG
1 TABLET ORAL
Qty: 0 | Refills: 0 | DISCHARGE
Start: 2019-05-10

## 2019-05-10 RX ORDER — DOCUSATE SODIUM 100 MG
1 CAPSULE ORAL
Qty: 0 | Refills: 0 | DISCHARGE
Start: 2019-05-10

## 2019-05-10 RX ORDER — ACETAMINOPHEN 500 MG
3 TABLET ORAL
Qty: 0 | Refills: 0 | DISCHARGE
Start: 2019-05-10

## 2019-05-10 RX ADMIN — Medication 325 MILLIGRAM(S): at 05:26

## 2019-05-10 RX ADMIN — Medication 600 MILLIGRAM(S): at 12:17

## 2019-05-10 RX ADMIN — Medication 600 MILLIGRAM(S): at 12:43

## 2019-05-10 RX ADMIN — Medication 975 MILLIGRAM(S): at 12:43

## 2019-05-10 RX ADMIN — Medication 500 MILLIGRAM(S): at 12:17

## 2019-05-10 RX ADMIN — HEPARIN SODIUM 5000 UNIT(S): 5000 INJECTION INTRAVENOUS; SUBCUTANEOUS at 05:26

## 2019-05-10 RX ADMIN — Medication 975 MILLIGRAM(S): at 12:13

## 2019-05-10 RX ADMIN — Medication 325 MILLIGRAM(S): at 12:18

## 2019-05-10 RX ADMIN — Medication 100 MILLIGRAM(S): at 05:26

## 2019-05-10 RX ADMIN — Medication 600 MILLIGRAM(S): at 06:23

## 2019-05-10 RX ADMIN — Medication 600 MILLIGRAM(S): at 05:22

## 2019-05-10 NOTE — PROGRESS NOTE ADULT - PROBLEM SELECTOR PLAN 2
Pt clinically improved - afebrile >24 hrs with negative urine culture.  F/U final blood culture (though no growth to date.)

## 2019-05-10 NOTE — PROGRESS NOTE ADULT - ASSESSMENT
A/P:  33y  POD # 3 S/P  primary   section for arrest c/b choriormanionitis s/p antibiotics, afebrile and doing well.
Stable post op
A/P:  33y  POD # 1 S/P primary c/s for arrest of dilation. Patient is doing well.    PMHx: Hypothyroidism  PAST MEDICAL & SURGICAL HISTORY: Hypothyroidism    Increase OOB  Renew IVF  Renew PCEA  DVT ppx  Dressing removed  Due to void  Regular diet  AM CBC  Routine Postpartum/Post-op care

## 2019-05-10 NOTE — DISCHARGE NOTE OB - MEDICATION SUMMARY - MEDICATIONS TO TAKE
I will START or STAY ON the medications listed below when I get home from the hospital:    acetaminophen 325 mg oral tablet  -- 3 tab(s) by mouth every 6 hours  -- Indication: For pain    ibuprofen 600 mg oral tablet  -- 1 tab(s) by mouth every 6 hours  -- Indication: For pain    oxyCODONE 5 mg oral tablet  -- 1 tab(s) by mouth once, As needed, Severe Pain (7 - 10) MDD:6  -- Indication: For pain    docusate sodium 100 mg oral capsule  -- 1 cap(s) by mouth 2 times a day  -- Indication: For constipation    simethicone 80 mg oral tablet, chewable  -- 1 tab(s) by mouth every 4 hours, As needed, Gas  -- Indication: For gas

## 2019-05-10 NOTE — DISCHARGE NOTE OB - HOSPITAL COURSE
Uncomplicated  delivery. Post partum course complicated by maternal temp/chorio treated with Abx and asymptomatic anemia

## 2019-05-10 NOTE — DISCHARGE NOTE OB - CARE PROVIDER_API CALL
Adama Muir)  Obstetrics and Gynecology  [address] [place] [address]  Phone: [phone]  Fax: [phone]  Follow Up Time:

## 2019-05-10 NOTE — DISCHARGE NOTE OB - PATIENT PORTAL LINK FT
You can access the [facility] Patient Portal, offered by [facility], by registering with the following website: [email or web address]

## 2019-05-10 NOTE — PROGRESS NOTE ADULT - REASON FOR ADMISSION
Discussion/Summary   THe u/s shows no blockage and the flow is about the same as 5 yrs ago...no change in medications advised.           Verified Results  [facility] CAROTID DPLX CARMEN [id number] 02:06PM JAILYN WOOTEN   Ordering Provider: JAILYN WOOTEN.    Reason For Study: atherosclerosis of both carotid arteries,atherosclerosis of both carotid arteries.     Test Name Result Flag Reference   [facility] CAROTID DPLX CARMEN (Report)     Accession #    [id number]    EXAM: EXTRACRANIAL BILATERAL CAROTID ULTRASOUND WITH DUPLEX DOPPLER:    CLINICAL INDICATION: Atherosclerotic disease.    COMPARISON: 05/13/2013    TECHNIQUE: Utilizing real-time gray scale imaging, color flow and duplex Doppler waveform   analysis evaluation of the extracranial carotid arteries was performed.    FINDINGS:    Right Carotid:  No significant plaques are demonstrated. The peak systolic velocity of the right internal   carotid artery is 77 cm/sec, whereas the end diastolic velocity is 18 cm/sec. The ICA/CCA ratio   is 1.2. The right external carotid artery shows normal color flow and waveform. The vertebral   artery has antegrade flow.    Left Carotid:   No significant plaques are demonstrated. The peak systolic velocity of the left internal   carotid artery is 98 cm/sec, whereas the end diastolic velocity is 37 cm/sec. The ICA/CCA ratio   is 1.5. The left external carotid artery shows normal color flow and waveform. The vertebral   artery has antegrade flow.    IMPRESSION:    No evidence of hemodynamically significant stenosis. No significant plaques are demonstrated.    Measurement of carotid stenosis is based on velocity and ratio criteria that correlate the   residual internal carotid diameter with [employer] Symptomatic Carotid Endarterectomy Trial   (NASCET) - based stenosis levels.    Validated velocity measurements/criteria are extrapolated from diameter data as defined by the   Society of Radiologists in Ultrasound Consensus Conference  Radiology 2003; 229; 340-346.    **** F I N A L ****    Transcribed By: SOTERO   08/29/18 4:03 pm    Dictated By:      CAROLINE KIRBY MD    Electronically Reviewed and Approved By:      CAROLINE KIRBY MD 08/29/18 4:05 pm       Message   CMA      Delivery

## 2019-05-10 NOTE — PROGRESS NOTE ADULT - SUBJECTIVE AND OBJECTIVE BOX
Postpartum Note-  Section POD#3    Prenatal Labs  Blood type: B Positive  Rubella IgG: Immune  RPR: Negative      S: Patient w/o complaints and says she feels much better than POD#2, pain is controlled.  Pt is OOB, tolerating PO, passing flatus, voiding. Vaginal bleeding minimal. Pt is breast feeding. Denies dizziness, CP, SOB, N/V, abdominal pain or calf pain.    pmhx: Hypothyroidism    O:  Vital Signs Last 24 Hrs  T(C): 36.7 (10 May 2019 05:00), Max: 36.8 (09 May 2019 21:30)  T(F): 98.1 (10 May 2019 05:00), Max: 98.2 (09 May 2019 21:30)  HR: 75 (10 May 2019 05:00) (75 - 88)  BP: 93/57 (10 May 2019 05:00) (93/57 - 94/65)  RR: 18 (10 May 2019 05:00) (18 - 18)  SpO2: 97% (10 May 2019 05:00) (97% - 99%)     Gen: NAD  Abdomen: Soft, nontender, non-distended, fundus firm.  Incision: subQ w/ Clean/dry/ intact.    -erythema   -ecchymosis     Lochia: WNL  Ext:  Neg Edema, Neg Calf tenderness b/l.    LABS:    AM CBC pending.                 8.9    11.70 )-----------( 218      (  @ 08:53 )             27.2                9.3    12.2  )-----------( 207      (  @ 00:15 )             26.6                5.3    7.3   )-----------( 135      (  @ 23:05 )             15.3                12.4   11.3  )-----------( 276      (  @ 08:08 )             35.6

## 2019-05-10 NOTE — PROGRESS NOTE ADULT - PROBLEM SELECTOR PLAN 1
Increase OOB  Regular diet  AM CBC  PO Pain Protocol  Continue heparin for DVT ppx.  Routine Postop/Postpartum care  Discharge Planning

## 2019-05-13 LAB
CULTURE RESULTS: SIGNIFICANT CHANGE UP
CULTURE RESULTS: SIGNIFICANT CHANGE UP
SPECIMEN SOURCE: SIGNIFICANT CHANGE UP
SPECIMEN SOURCE: SIGNIFICANT CHANGE UP

## 2019-05-17 ENCOUNTER — EMERGENCY (EMERGENCY)
Facility: HOSPITAL | Age: 34
LOS: 1 days | End: 2019-05-17
Attending: EMERGENCY MEDICINE
Payer: COMMERCIAL

## 2019-05-17 VITALS
OXYGEN SATURATION: 99 % | DIASTOLIC BLOOD PRESSURE: 68 MMHG | HEART RATE: 65 BPM | RESPIRATION RATE: 20 BRPM | TEMPERATURE: 99 F | SYSTOLIC BLOOD PRESSURE: 105 MMHG | WEIGHT: 104.06 LBS

## 2019-05-17 PROCEDURE — 99284 EMERGENCY DEPT VISIT MOD MDM: CPT | Mod: 25

## 2019-05-17 NOTE — ED PROVIDER NOTE - ATTENDING CONTRIBUTION TO CARE
------------ATTENDING NOTE------------   healthy pt w/  c/o daily light uterine spotting blood since  10 days ago, 1 hr ago w/ increased bleeding (heavy day of previous menstruations), mild midline suprapubic cramping discomfort, no fevers, no lightheaded / near syncope / sob / exertional symptoms, HD stable, overall soft benign abd, awaiting labs / imaging / c/w SAVANNAH -->  - Bubba De Dios MD   ----------------------------------------------

## 2019-05-17 NOTE — ED PROVIDER NOTE - NSFOLLOWUPINSTRUCTIONS_ED_ALL_ED_FT
(1) Follow up with your OB/GYN as discussed  (2) Immediately seek care at your nearest emergency room if your worsen, persist, or do not resolve

## 2019-05-17 NOTE — ED PROVIDER NOTE - OBJECTIVE STATEMENT
32yo  pmhx hypothyroid s/p resection during childhood pw cc of Vaginal bleeding    Had a  On may 7th due to fever, baby was 37.5 weeks, no complications from . 40 minutes before coming to ER had sudden onset bleeding from uterus, soaked through 4 pads. No history of easy bleeding. Denies any other blood thinners. No F/C. No N/V/D.   No history of fibroids or cysts. No hx of STD's.    Meds: synthyroid 34yo  pmhx hypothyroid s/p resection during childhood pw cc of Vaginal bleeding    Had a  On may 7th due to fever, baby was 37.5 weeks, no complications from . 40 minutes before coming to ER had sudden onset bleeding from uterus, soaked through 4 pads. No history of easy bleeding. Denies any other blood thinners. No F/C. No N/V/D.   No history of fibroids or cysts. No hx of STD's.    Meds: synthyroid  OBGYN: Dr. shannon Newell

## 2019-05-17 NOTE — ED PROVIDER NOTE - PROGRESS NOTE DETAILS
Ishaan pgy1: SAVANNAH states pt can go home and f/u, D/W pt results and plan to go home and f/u, tolerating PO and ambulating without issue, will d/c

## 2019-05-17 NOTE — ED PROVIDER NOTE - PHYSICAL EXAMINATION
General: well appearing, interactive, well nourished, NAD  HEENT: pupils equal and reactive  Resp: lungs clear to auscultation bilaterally, symmetric chest wall rise  Abd: soft, nontender, nondistended, normoactive bowel sounds  : no CVA tenderness  Neuro: Moving all extremities  Skin:  normal color for race

## 2019-05-18 VITALS
SYSTOLIC BLOOD PRESSURE: 99 MMHG | HEART RATE: 68 BPM | OXYGEN SATURATION: 97 % | TEMPERATURE: 98 F | RESPIRATION RATE: 18 BRPM | DIASTOLIC BLOOD PRESSURE: 57 MMHG

## 2019-05-18 DIAGNOSIS — Z98.891 HISTORY OF UTERINE SCAR FROM PREVIOUS SURGERY: Chronic | ICD-10-CM

## 2019-05-18 LAB
ALBUMIN SERPL ELPH-MCNC: 3.3 G/DL — SIGNIFICANT CHANGE UP (ref 3.3–5)
ALP SERPL-CCNC: 75 U/L — SIGNIFICANT CHANGE UP (ref 40–120)
ALT FLD-CCNC: 17 U/L — SIGNIFICANT CHANGE UP (ref 10–45)
ANION GAP SERPL CALC-SCNC: 13 MMOL/L — SIGNIFICANT CHANGE UP (ref 5–17)
APTT BLD: 34.2 SEC — SIGNIFICANT CHANGE UP (ref 27.5–36.3)
AST SERPL-CCNC: 21 U/L — SIGNIFICANT CHANGE UP (ref 10–40)
BASOPHILS # BLD AUTO: 0.1 K/UL — SIGNIFICANT CHANGE UP (ref 0–0.2)
BASOPHILS NFR BLD AUTO: 0.6 % — SIGNIFICANT CHANGE UP (ref 0–2)
BILIRUB SERPL-MCNC: 0.2 MG/DL — SIGNIFICANT CHANGE UP (ref 0.2–1.2)
BLD GP AB SCN SERPL QL: NEGATIVE — SIGNIFICANT CHANGE UP
BUN SERPL-MCNC: 16 MG/DL — SIGNIFICANT CHANGE UP (ref 7–23)
CALCIUM SERPL-MCNC: 8.6 MG/DL — SIGNIFICANT CHANGE UP (ref 8.4–10.5)
CHLORIDE SERPL-SCNC: 106 MMOL/L — SIGNIFICANT CHANGE UP (ref 96–108)
CO2 SERPL-SCNC: 20 MMOL/L — LOW (ref 22–31)
CREAT SERPL-MCNC: 0.71 MG/DL — SIGNIFICANT CHANGE UP (ref 0.5–1.3)
EOSINOPHIL # BLD AUTO: 0.1 K/UL — SIGNIFICANT CHANGE UP (ref 0–0.5)
EOSINOPHIL NFR BLD AUTO: 1.4 % — SIGNIFICANT CHANGE UP (ref 0–6)
GLUCOSE SERPL-MCNC: 87 MG/DL — SIGNIFICANT CHANGE UP (ref 70–99)
HCT VFR BLD CALC: 30.4 % — LOW (ref 34.5–45)
HGB BLD-MCNC: 10.4 G/DL — LOW (ref 11.5–15.5)
INR BLD: 1.01 RATIO — SIGNIFICANT CHANGE UP (ref 0.88–1.16)
LYMPHOCYTES # BLD AUTO: 2.4 K/UL — SIGNIFICANT CHANGE UP (ref 1–3.3)
LYMPHOCYTES # BLD AUTO: 25.4 % — SIGNIFICANT CHANGE UP (ref 13–44)
MCHC RBC-ENTMCNC: 30.1 PG — SIGNIFICANT CHANGE UP (ref 27–34)
MCHC RBC-ENTMCNC: 34.2 GM/DL — SIGNIFICANT CHANGE UP (ref 32–36)
MCV RBC AUTO: 88.1 FL — SIGNIFICANT CHANGE UP (ref 80–100)
MONOCYTES # BLD AUTO: 0.4 K/UL — SIGNIFICANT CHANGE UP (ref 0–0.9)
MONOCYTES NFR BLD AUTO: 4 % — SIGNIFICANT CHANGE UP (ref 2–14)
NEUTROPHILS # BLD AUTO: 6.4 K/UL — SIGNIFICANT CHANGE UP (ref 1.8–7.4)
NEUTROPHILS NFR BLD AUTO: 68.6 % — SIGNIFICANT CHANGE UP (ref 43–77)
PLATELET # BLD AUTO: 638 K/UL — HIGH (ref 150–400)
POTASSIUM SERPL-MCNC: 3.7 MMOL/L — SIGNIFICANT CHANGE UP (ref 3.5–5.3)
POTASSIUM SERPL-SCNC: 3.7 MMOL/L — SIGNIFICANT CHANGE UP (ref 3.5–5.3)
PROT SERPL-MCNC: 6.6 G/DL — SIGNIFICANT CHANGE UP (ref 6–8.3)
PROTHROM AB SERPL-ACNC: 11.5 SEC — SIGNIFICANT CHANGE UP (ref 10–12.9)
RBC # BLD: 3.45 M/UL — LOW (ref 3.8–5.2)
RBC # FLD: 12.2 % — SIGNIFICANT CHANGE UP (ref 10.3–14.5)
RH IG SCN BLD-IMP: POSITIVE — SIGNIFICANT CHANGE UP
SODIUM SERPL-SCNC: 139 MMOL/L — SIGNIFICANT CHANGE UP (ref 135–145)
WBC # BLD: 9.2 K/UL — SIGNIFICANT CHANGE UP (ref 3.8–10.5)
WBC # FLD AUTO: 9.2 K/UL — SIGNIFICANT CHANGE UP (ref 3.8–10.5)

## 2019-05-18 PROCEDURE — 85027 COMPLETE CBC AUTOMATED: CPT

## 2019-05-18 PROCEDURE — 86901 BLOOD TYPING SEROLOGIC RH(D): CPT

## 2019-05-18 PROCEDURE — 76830 TRANSVAGINAL US NON-OB: CPT

## 2019-05-18 PROCEDURE — 86900 BLOOD TYPING SEROLOGIC ABO: CPT

## 2019-05-18 PROCEDURE — 76830 TRANSVAGINAL US NON-OB: CPT | Mod: 26

## 2019-05-18 PROCEDURE — 76856 US EXAM PELVIC COMPLETE: CPT

## 2019-05-18 PROCEDURE — 85730 THROMBOPLASTIN TIME PARTIAL: CPT

## 2019-05-18 PROCEDURE — 85610 PROTHROMBIN TIME: CPT

## 2019-05-18 PROCEDURE — 99284 EMERGENCY DEPT VISIT MOD MDM: CPT | Mod: 25

## 2019-05-18 PROCEDURE — 86850 RBC ANTIBODY SCREEN: CPT

## 2019-05-18 PROCEDURE — 80053 COMPREHEN METABOLIC PANEL: CPT

## 2019-05-18 PROCEDURE — 76856 US EXAM PELVIC COMPLETE: CPT | Mod: 26,59

## 2019-05-18 NOTE — ED ADULT NURSE REASSESSMENT NOTE - NS ED NURSE REASSESS COMMENT FT1
Patient had one feminine pad soaked with bright red blood while on the unit.  Patient denies dizziness, lightheadedness, CP or SOB.  ED Attending Foster aware of this.  Patient placed on bedside CM.  Still pending blood results and U/S.

## 2019-05-18 NOTE — CONSULT NOTE ADULT - SUBJECTIVE AND OBJECTIVE BOX
R2 GYN CONSULT NOTE    33y  POD#11 from primary C/S for arrest of dil and cat 2 FHT presents with      OB/GYN HISTORY: C/S x1 at [facility],     GynHx: denies hx of fibroids, cysts, abnl pap smears, STIs    PMH: hypothyroidism    PSH: C/S x1    Social: denies T/E/D    Meds: synthroid 75mcg    Allergies: NKDA    REVIEW OF SYSTEMS: see HPI, otherwise neg      OBJECTIVE FINDINGS:    Vital Signs Last 24 Hrs  T(C): 37 (17 May 2019 23:43), Max: 37 (17 May 2019 23:09)  T(F): 98.6 (17 May 2019 23:43), Max: 98.6 (17 May 2019 23:09)  HR: 76 (17 May 2019 23:43) (65 - 76)  BP: 111/66 (17 May 2019 23:43) (105/68 - 111/66)  RR: 18 (17 May 2019 23:43) (18 - 20)  SpO2: 100% (17 May 2019 23:43) (99% - 100%)      PE:  Gen: Comfortable, NAD  CV: RRR  Pulm: CTAB  Abd: Soft, NT  Ext: No edema or tenderness bilaterally  Spec Exam:     LABS:                        10.4   9.2   )-----------( 638      ( 18 May 2019 00:07 )             30.4     05-18    139  |  106  |  16  ----------------------------<  87  3.7   |  20<L>  |  0.71    Ca    8.6      18 May 2019 00:07    TPro  6.6  /  Alb  3.3  /  TBili  0.2  /  DBili  x   /  AST  21  /  ALT  17  /  AlkPhos  75  05-18    PT/INR - ( 18 May 2019 00:07 )   PT: 11.5 sec;   INR: 1.01 ratio         PTT - ( 18 May 2019 00:07 )  PTT:34.2 sec        RADIOLOGY & ADDITIONAL STUDIES:  TVUS pending R2 GYN CONSULT NOTE    33y  POD#11 from primary C/S for arrest of dil and cat 2 FHT presents with vaginal bleeding.  Patient saturating 4 pads today.  Since in ED patient with small spotting.  _____________________________________      OB/GYN HISTORY: C/S x1 at [facility] for arrest of dilation and category II FHR tracing    GynHx: denies hx of fibroids, cysts, abnl pap smears, STIs    PMH: hypothyroidism    PSH: C/S x1    Social: denies T/E/D    Meds: synthroid 75mcg    Allergies: NKDA    REVIEW OF SYSTEMS: see HPI, otherwise neg      OBJECTIVE FINDINGS:    Vital Signs Last 24 Hrs  T(C): 37 (17 May 2019 23:43), Max: 37 (17 May 2019 23:09)  T(F): 98.6 (17 May 2019 23:43), Max: 98.6 (17 May 2019 23:09)  HR: 76 (17 May 2019 23:43) (65 - 76)  BP: 111/66 (17 May 2019 23:43) (105/68 - 111/66)  RR: 18 (17 May 2019 23:43) (18 - 20)  SpO2: 100% (17 May 2019 23:43) (99% - 100%)      PE:  Gen: Comfortable, NAD  CV: RRR  Pulm: CTAB  Abd: Soft, NT  Inc: pfannenstiel incision c/d/i, no signs of infection, healing well  Ext: No edema or tenderness bilaterally  Spec Exam: Moderate dark clot in vagina, evacuated.   Cervix notably posterior, not fully visualized.  No active bleeding noted.  Fundus firm, nontender.    LABS:                                   10.4   9.2   )-----------( 638      ( 18 May 2019 00:07 )             30.4                         8.3    8.31  )-----------( 252      ( 10 May 2019 08:03 ) *Last CBC at discharge             25.0     -    139  |  106  |  16  ----------------------------<  87  3.7   |  20<L>  |  0.71    Ca    8.6      18 May 2019 00:07    TPro  6.6  /  Alb  3.3  /  TBili  0.2  /  DBili  x   /  AST  21  /  ALT  17  /  AlkPhos  75  05-18    PT/INR - ( 18 May 2019 00:07 )   PT: 11.5 sec;   INR: 1.01 ratio         PTT - ( 18 May 2019 00:07 )  PTT:34.2 sec          EXAM:  US PELVIC COMPLETE                          EXAM:  US TRANSVAGINAL                            PROCEDURE DATE:  2019            INTERPRETATION:  CLINICAL INFORMATION: Status post  on .   Presents with vaginal bleeding.    COMPARISON: None available.    TECHNIQUE:     Transabdominal pelvic sonogram only. Endovaginal ultrasound was aborted   due to pain. Color and Spectral Doppler was performed.    FINDINGS:    Uterus: 15.4 x 6.6 x 8.3 cm. Enlarged, post gravid uterus. Changes in the   lower uterine segment related to recent  incision. Cervix is   grossly unremarkable.    Endometrium: 16 mm. Within normal limits. No focal endometrial mass or   hypervascularity.    Right ovary: 2.3 x 2.5 x 2.2 cm. Within normal limits.    Left ovary: 2.4 x 1.2 x 2.8 cm. Within normal limits.    Fluid: None.    IMPRESSION:    Enlarged post gravid uterus with nonspecific endometrial thickening, most   likely blood products.                VEENA RIGGS M.D., RADIOLOGY RESIDENT  This document has been electronically signed.  LARS KHAN M.D, ATTENDING RADIOLOGIST  This document has been electronically signed. May 18 2019  2:07AM R2 GYN CONSULT NOTE    33y  POD#11 from primary C/S for arrest of dil and cat 2 FHT presents with vaginal bleeding.  Pt states has only been spotting since CS, but tonight, patient saturating 4 pads, states blood was pouring out soaking through the pads, came into ED.  Since in ED, bleeding has gotten better.  Denies fevers, chills, SOB, CP, lightheadedness, dizziness, nausea, vomiting, abd pain, urinary symptoms, constipation, diarrhea.    OB/GYN HISTORY: C/S x1 at [facility] for arrest of dilation and category II FHR tracing    GynHx: denies hx of fibroids, cysts, abnl pap smears, STIs    PMH: hypothyroidism    PSH: C/S x1    Social: denies T/E/D    Meds: synthroid 75mcg    Allergies: NKDA    REVIEW OF SYSTEMS: see HPI, otherwise neg      OBJECTIVE FINDINGS:    Vital Signs Last 24 Hrs  T(C): 37 (17 May 2019 23:43), Max: 37 (17 May 2019 23:09)  T(F): 98.6 (17 May 2019 23:43), Max: 98.6 (17 May 2019 23:09)  HR: 76 (17 May 2019 23:43) (65 - 76)  BP: 111/66 (17 May 2019 23:43) (105/68 - 111/66)  RR: 18 (17 May 2019 23:43) (18 - 20)  SpO2: 100% (17 May 2019 23:43) (99% - 100%)      PE:  Gen: Comfortable, NAD  CV: RRR  Pulm: CTAB  Abd: Soft, NT, non distended, no rebound or guarding  Inc: pfannenstiel incision c/d/i, no signs of infection, healing well  Ext: No edema or tenderness bilaterally  Spec Exam: Moderate dark clot in vagina, evacuated.  Cervix notably posterior, not fully visualized.  No active bleeding noted.  Fundus firm, nontender.    LABS:                                   10.4   9.2   )-----------( 638      ( 18 May 2019 00:07 )             30.4                         8.3    8.31  )-----------( 252      ( 10 May 2019 08:03 ) *Last CBC at discharge             25.0     05-18    139  |  106  |  16  ----------------------------<  87  3.7   |  20<L>  |  0.71    Ca    8.6      18 May 2019 00:07    TPro  6.6  /  Alb  3.3  /  TBili  0.2  /  DBili  x   /  AST  21  /  ALT  17  /  AlkPhos  75  05-    PT/INR - ( 18 May 2019 00:07 )   PT: 11.5 sec;   INR: 1.01 ratio         PTT - ( 18 May 2019 00:07 )  PTT:34.2 sec          EXAM:  US PELVIC COMPLETE                          EXAM:  US TRANSVAGINAL                            PROCEDURE DATE:  2019            INTERPRETATION:  CLINICAL INFORMATION: Status post  on .   Presents with vaginal bleeding.    COMPARISON: None available.    TECHNIQUE:     Transabdominal pelvic sonogram only. Endovaginal ultrasound was aborted   due to pain. Color and Spectral Doppler was performed.    FINDINGS:    Uterus: 15.4 x 6.6 x 8.3 cm. Enlarged, post gravid uterus. Changes in the   lower uterine segment related to recent  incision. Cervix is   grossly unremarkable.    Endometrium: 16 mm. Within normal limits. No focal endometrial mass or   hypervascularity.    Right ovary: 2.3 x 2.5 x 2.2 cm. Within normal limits.    Left ovary: 2.4 x 1.2 x 2.8 cm. Within normal limits.    Fluid: None.    IMPRESSION:    Enlarged post gravid uterus with nonspecific endometrial thickening, most   likely blood products.              VEENA RIGGS M.D., RADIOLOGY RESIDENT  This document has been electronically signed.  LARS KHAN M.D, ATTENDING RADIOLOGIST  This document has been electronically signed. May 18 2019  2:07AM

## 2019-05-18 NOTE — ED ADULT NURSE NOTE - OBJECTIVE STATEMENT
Patient with history of hypothyroidism (synthroid) BIBA from home with c/o vaginal bleeding x40 mins PTA.  Patient states she had sudden onset of BRB tonight and "soaked through 3 pads".  Patient had  in this hospital on 19 with no complications.  Baby born at 37.5 weeks with no complications during the pregnancy.  Patient denies CP, SOB, lightheadedness, dizziness, numbness, tingling, fevers, chills, n/v/d.  Patient verbalizes that she is "very nervous" and appears anxious.   is at the bedside for support.  Safety maintained, VSS. Patient arrives with 18G to the L wrist by EMS with NS in progress.  No blood thinners.

## 2019-05-18 NOTE — ED ADULT NURSE NOTE - NSIMPLEMENTINTERV_GEN_ALL_ED
Implemented All Universal Safety Interventions:  [place] to call system. Call bell, personal items and telephone within reach. Instruct patient to call for assistance. Room bathroom lighting operational. Non-slip footwear when patient is off stretcher. Physically safe environment: no spills, clutter or unnecessary equipment. Stretcher in lowest position, wheels locked, appropriate side rails in place.

## 2019-05-18 NOTE — CONSULT NOTE ADULT - ASSESSMENT
A/P: 33y  POD#11 from pC/S who presents with VB x1day, stable  - YVONNE Del Rosario, PGY2 A/P: 33y  POD#11 from pLTCS who presents with VB x1day.  No active bleeding, fundus firm, H&H higher than at discharge and vital signs stable.  TVUS without retained POC.   - No acute OB/Gyn intervention indicated  - Follow up in 2-4 days as an outpatient with Dr. Muir  - Patient to continue counting number of fully saturated pads.  If bleeding 2+ pads per hour for 2+ hours patient to call her obgyn.    D/w Dr Muir by Dr More PGY4  KINDRA Del Rosario, PGY2 A/P: 33y  POD#11 from pLTCS who presents with VB x1day.  No active bleeding, fundus firm, H&H higher than at discharge and vital signs stable.  TVUS without retained POC.   - No acute OB/Gyn intervention indicated  - Follow up in 2-4 days as an outpatient with Dr. Muir  - Patient to continue counting number of fully saturated pads.  If bleeding 2+ pads per hour for 2+ hours patient to call her obgyn.  - no acute gyn interventions at this time, appreciate ED care    D/w Dr Muir by Dr More PGY4  KINDRA Del Rosario, PGY2

## 2021-03-11 ENCOUNTER — APPOINTMENT (OUTPATIENT)
Dept: DERMATOLOGY | Facility: CLINIC | Age: 36
End: 2021-03-11
Payer: COMMERCIAL

## 2021-03-11 ENCOUNTER — NON-APPOINTMENT (OUTPATIENT)
Age: 36
End: 2021-03-11

## 2021-03-11 DIAGNOSIS — L30.9 DERMATITIS, UNSPECIFIED: ICD-10-CM

## 2021-03-11 PROBLEM — Z00.00 ENCOUNTER FOR PREVENTIVE HEALTH EXAMINATION: Status: ACTIVE | Noted: 2021-03-11

## 2021-03-11 PROCEDURE — 99204 OFFICE O/P NEW MOD 45 MIN: CPT

## 2021-03-11 PROCEDURE — 99072 ADDL SUPL MATRL&STAF TM PHE: CPT

## 2021-03-11 RX ORDER — MOMETASONE FUROATE 1 MG/G
0.1 OINTMENT TOPICAL
Qty: 1 | Refills: 0 | Status: ACTIVE | COMMUNITY
Start: 2021-03-11 | End: 1900-01-01

## 2021-03-19 ENCOUNTER — APPOINTMENT (OUTPATIENT)
Dept: DERMATOLOGY | Facility: CLINIC | Age: 36
End: 2021-03-19

## 2021-05-04 ENCOUNTER — APPOINTMENT (OUTPATIENT)
Dept: DERMATOLOGY | Facility: CLINIC | Age: 36
End: 2021-05-04

## 2022-10-31 NOTE — ED ADULT NURSE NOTE - CHPI ED NUR SYMPTOMS POS
Anisha York Key: [id number] - PA Case ID: [id number] - Rx #: [id number] help? Call us at [phone]  Outcome  Approvedtoday  PA Case: [id number], Status: Approved, Coverage Starts on: 10/31/2022 12:00:00 AM, Coverage Ends on: 11/30/2022 12:00:00 AM.  Drug  Omnipod 5 G6 Intro (Gen 5) kit  Form  [place] [employer] Electronic PA Form (2017 NCPDP)   vaginal bleeding

## 2023-12-07 ENCOUNTER — OUTPATIENT (OUTPATIENT)
Dept: OUTPATIENT SERVICES | Facility: HOSPITAL | Age: 38
LOS: 1 days | End: 2023-12-07
Payer: COMMERCIAL

## 2023-12-07 VITALS
TEMPERATURE: 99 F | OXYGEN SATURATION: 97 % | DIASTOLIC BLOOD PRESSURE: 70 MMHG | HEIGHT: 61 IN | HEART RATE: 84 BPM | RESPIRATION RATE: 16 BRPM | SYSTOLIC BLOOD PRESSURE: 122 MMHG | WEIGHT: 100.97 LBS

## 2023-12-07 DIAGNOSIS — E89.0 POSTPROCEDURAL HYPOTHYROIDISM: Chronic | ICD-10-CM

## 2023-12-07 DIAGNOSIS — R68.89 OTHER GENERAL SYMPTOMS AND SIGNS: ICD-10-CM

## 2023-12-07 DIAGNOSIS — N20.0 CALCULUS OF KIDNEY: ICD-10-CM

## 2023-12-07 DIAGNOSIS — E03.9 HYPOTHYROIDISM, UNSPECIFIED: ICD-10-CM

## 2023-12-07 DIAGNOSIS — Z01.818 ENCOUNTER FOR OTHER PREPROCEDURAL EXAMINATION: ICD-10-CM

## 2023-12-07 DIAGNOSIS — Z98.891 HISTORY OF UTERINE SCAR FROM PREVIOUS SURGERY: Chronic | ICD-10-CM

## 2023-12-07 DIAGNOSIS — N23 UNSPECIFIED RENAL COLIC: ICD-10-CM

## 2023-12-07 LAB
ANION GAP SERPL CALC-SCNC: 8 MMOL/L — SIGNIFICANT CHANGE UP (ref 5–17)
ANION GAP SERPL CALC-SCNC: 8 MMOL/L — SIGNIFICANT CHANGE UP (ref 5–17)
BUN SERPL-MCNC: 12 MG/DL — SIGNIFICANT CHANGE UP (ref 7–23)
BUN SERPL-MCNC: 12 MG/DL — SIGNIFICANT CHANGE UP (ref 7–23)
CALCIUM SERPL-MCNC: 9.3 MG/DL — SIGNIFICANT CHANGE UP (ref 8.4–10.5)
CALCIUM SERPL-MCNC: 9.3 MG/DL — SIGNIFICANT CHANGE UP (ref 8.4–10.5)
CHLORIDE SERPL-SCNC: 102 MMOL/L — SIGNIFICANT CHANGE UP (ref 96–108)
CHLORIDE SERPL-SCNC: 102 MMOL/L — SIGNIFICANT CHANGE UP (ref 96–108)
CO2 SERPL-SCNC: 25 MMOL/L — SIGNIFICANT CHANGE UP (ref 22–31)
CO2 SERPL-SCNC: 25 MMOL/L — SIGNIFICANT CHANGE UP (ref 22–31)
CREAT SERPL-MCNC: 0.58 MG/DL — SIGNIFICANT CHANGE UP (ref 0.5–1.3)
CREAT SERPL-MCNC: 0.58 MG/DL — SIGNIFICANT CHANGE UP (ref 0.5–1.3)
EGFR: 117 ML/MIN/1.73M2 — SIGNIFICANT CHANGE UP
EGFR: 117 ML/MIN/1.73M2 — SIGNIFICANT CHANGE UP
GLUCOSE SERPL-MCNC: 98 MG/DL — SIGNIFICANT CHANGE UP (ref 70–99)
GLUCOSE SERPL-MCNC: 98 MG/DL — SIGNIFICANT CHANGE UP (ref 70–99)
HCT VFR BLD CALC: 36 % — SIGNIFICANT CHANGE UP (ref 34.5–45)
HCT VFR BLD CALC: 36 % — SIGNIFICANT CHANGE UP (ref 34.5–45)
HGB BLD-MCNC: 12 G/DL — SIGNIFICANT CHANGE UP (ref 11.5–15.5)
HGB BLD-MCNC: 12 G/DL — SIGNIFICANT CHANGE UP (ref 11.5–15.5)
MCHC RBC-ENTMCNC: 27.9 PG — SIGNIFICANT CHANGE UP (ref 27–34)
MCHC RBC-ENTMCNC: 27.9 PG — SIGNIFICANT CHANGE UP (ref 27–34)
MCHC RBC-ENTMCNC: 33.3 GM/DL — SIGNIFICANT CHANGE UP (ref 32–36)
MCHC RBC-ENTMCNC: 33.3 GM/DL — SIGNIFICANT CHANGE UP (ref 32–36)
MCV RBC AUTO: 83.7 FL — SIGNIFICANT CHANGE UP (ref 80–100)
MCV RBC AUTO: 83.7 FL — SIGNIFICANT CHANGE UP (ref 80–100)
NRBC # BLD: 0 /100 WBCS — SIGNIFICANT CHANGE UP (ref 0–0)
NRBC # BLD: 0 /100 WBCS — SIGNIFICANT CHANGE UP (ref 0–0)
PLATELET # BLD AUTO: 337 K/UL — SIGNIFICANT CHANGE UP (ref 150–400)
PLATELET # BLD AUTO: 337 K/UL — SIGNIFICANT CHANGE UP (ref 150–400)
POTASSIUM SERPL-MCNC: 3.8 MMOL/L — SIGNIFICANT CHANGE UP (ref 3.5–5.3)
POTASSIUM SERPL-MCNC: 3.8 MMOL/L — SIGNIFICANT CHANGE UP (ref 3.5–5.3)
POTASSIUM SERPL-SCNC: 3.8 MMOL/L — SIGNIFICANT CHANGE UP (ref 3.5–5.3)
POTASSIUM SERPL-SCNC: 3.8 MMOL/L — SIGNIFICANT CHANGE UP (ref 3.5–5.3)
RBC # BLD: 4.3 M/UL — SIGNIFICANT CHANGE UP (ref 3.8–5.2)
RBC # BLD: 4.3 M/UL — SIGNIFICANT CHANGE UP (ref 3.8–5.2)
RBC # FLD: 12 % — SIGNIFICANT CHANGE UP (ref 10.3–14.5)
RBC # FLD: 12 % — SIGNIFICANT CHANGE UP (ref 10.3–14.5)
SODIUM SERPL-SCNC: 135 MMOL/L — SIGNIFICANT CHANGE UP (ref 135–145)
SODIUM SERPL-SCNC: 135 MMOL/L — SIGNIFICANT CHANGE UP (ref 135–145)
WBC # BLD: 5.39 K/UL — SIGNIFICANT CHANGE UP (ref 3.8–10.5)
WBC # BLD: 5.39 K/UL — SIGNIFICANT CHANGE UP (ref 3.8–10.5)
WBC # FLD AUTO: 5.39 K/UL — SIGNIFICANT CHANGE UP (ref 3.8–10.5)
WBC # FLD AUTO: 5.39 K/UL — SIGNIFICANT CHANGE UP (ref 3.8–10.5)

## 2023-12-07 PROCEDURE — G0463: CPT

## 2023-12-07 PROCEDURE — 80048 BASIC METABOLIC PNL TOTAL CA: CPT

## 2023-12-07 PROCEDURE — 87086 URINE CULTURE/COLONY COUNT: CPT

## 2023-12-07 PROCEDURE — 85027 COMPLETE CBC AUTOMATED: CPT

## 2023-12-07 NOTE — H&P PST ADULT - ATTENDING COMMENTS
9mm left proximal ureteral stone with hydronephrosis. 3mm LLP stone. Imaging reviewed. Patient presents today for cystoscopy, left ureteroscopy, laser lithotripsy and stent insertion. R/B/A reviewed in detail. Consent reviewed and signed. Marked on left.    Palomo Temple MD  [facility]  [address], [facility] [address], [place] [address]  Office: [phone] Fax: [phone] 9mm left proximal ureteral stone with hydronephrosis. 3mm LLP stone. Imaging reviewed. Patient presents today for cystoscopy, left ureteroscopy, laser lithotripsy and stent insertion. R/B/A reviewed in detail. Consent reviewed and signed. Marked on left.    Palomo Temple MD  [facility]  [address], [facility] [address], [place] [address]  Office: [phone] Fax: [phone]

## 2023-12-07 NOTE — H&P PST ADULT - NEGATIVE ENMT SYMPTOMS
no hearing difficulty/no ear pain/no tinnitus/no vertigo/no sinus symptoms/no nasal congestion/no nasal discharge/no nasal obstruction/no post-nasal discharge/no recurrent cold sores/no abnormal taste sensation

## 2023-12-07 NOTE — H&P PST ADULT - NEUROLOGICAL
negative cranial nerves II-XII intact/sensation intact/responds to pain/responds to verbal commands/deep reflexes intact/cranial nerves intact/no spontaneous movement

## 2023-12-07 NOTE — H&P PST ADULT - GENITOURINARY COMMENTS
h/o left back pain with UTI 10 yrs. ago, then had left back pain 3weeks ago , no fever, sonogram & X-ray which reveal having left renal  calculi  x2

## 2023-12-07 NOTE — H&P PST ADULT - NEGATIVE GENERAL SYMPTOMS
no fever/no chills/no sweating/no anorexia/no weight loss/no weight gain/no polyphagia/no polyuria/no malaise/no fatigue

## 2023-12-07 NOTE — H&P PST ADULT - HISTORY OF PRESENT ILLNESS
38 year old female  ( S/P section in 2019), PMH of Hypothyroidism  & had left back pain with UTI 10 years ago, s/p UC visit 3 weeks ago with left back pain but discharged sent home & pain was back seen by  PMD with left back pain s/p abdominal sonogram & X-ray reveal having 2 kidney stones in left kidney. S/p urology consult &  Scheduled for Cystoscopy, left ureteroscopy, soltive laser, stent insertion w/fluroscopy on 23.   38 year old female  ( S/P section in 2019), PMH of Hypothyroidism  & had left back pain with UTI 10 years ago, s/p UC visit 3 weeks ago with left back pain but discharged sent home & pain was back seen by  PMD with left back pain s/p abdominal sonogram & X-ray reveal having 2 kidney stones in left kidney. S/p urology consult &  Scheduled for Cystoscopy, left ureteroscopy, soltive laser, stent insertion w/ fluoroscopy on 23.

## 2023-12-07 NOTE — H&P PST ADULT - ASSESSMENT
left renal calculi: Cystoscopy, left ureteroscopy, soltive laser, stent insertion w/fluroscopy on 12/21/23.    Activity:   physically pretty active, home chores, walks about a mile daily   Energy Expenditure score (DASI SCORE METS): 7.3  Symptoms : denies chest pain, palpitations, dyspnea, dizziness or  LING,   Dental: Patient denies Loose teeth/Denture.

## 2023-12-07 NOTE — H&P PST ADULT - NSICDXPASTMEDICALHX_GEN_ALL_CORE_FT
PAST MEDICAL HISTORY:  Flu-like symptoms     History of thyroid cancer     History of UTI     Hypothyroidism     Renal calculus, left

## 2023-12-20 ENCOUNTER — TRANSCRIPTION ENCOUNTER (OUTPATIENT)
Age: 38
End: 2023-12-20

## 2023-12-21 ENCOUNTER — TRANSCRIPTION ENCOUNTER (OUTPATIENT)
Age: 38
End: 2023-12-21

## 2023-12-21 ENCOUNTER — OUTPATIENT (OUTPATIENT)
Dept: INPATIENT UNIT | Facility: HOSPITAL | Age: 38
LOS: 1 days | End: 2023-12-21
Payer: COMMERCIAL

## 2023-12-21 VITALS
RESPIRATION RATE: 16 BRPM | SYSTOLIC BLOOD PRESSURE: 98 MMHG | WEIGHT: 100.97 LBS | HEART RATE: 74 BPM | OXYGEN SATURATION: 99 % | HEIGHT: 61 IN | DIASTOLIC BLOOD PRESSURE: 66 MMHG | TEMPERATURE: 98 F

## 2023-12-21 VITALS
DIASTOLIC BLOOD PRESSURE: 55 MMHG | RESPIRATION RATE: 18 BRPM | SYSTOLIC BLOOD PRESSURE: 98 MMHG | TEMPERATURE: 97 F | OXYGEN SATURATION: 99 % | HEART RATE: 71 BPM

## 2023-12-21 DIAGNOSIS — E89.0 POSTPROCEDURAL HYPOTHYROIDISM: Chronic | ICD-10-CM

## 2023-12-21 DIAGNOSIS — Z98.891 HISTORY OF UTERINE SCAR FROM PREVIOUS SURGERY: Chronic | ICD-10-CM

## 2023-12-21 DIAGNOSIS — N20.0 CALCULUS OF KIDNEY: ICD-10-CM

## 2023-12-21 DIAGNOSIS — N13.30 UNSPECIFIED HYDRONEPHROSIS: ICD-10-CM

## 2023-12-21 LAB
HCG UR QL: NEGATIVE — SIGNIFICANT CHANGE UP
HCG UR QL: NEGATIVE — SIGNIFICANT CHANGE UP

## 2023-12-21 PROCEDURE — 81025 URINE PREGNANCY TEST: CPT

## 2023-12-21 PROCEDURE — C1769: CPT

## 2023-12-21 PROCEDURE — C1758: CPT

## 2023-12-21 PROCEDURE — 52356 CYSTO/URETERO W/LITHOTRIPSY: CPT | Mod: 74

## 2023-12-21 PROCEDURE — 76000 FLUOROSCOPY <1 HR PHYS/QHP: CPT

## 2023-12-21 DEVICE — URETERAL CATH OPEN END 5FR 70CM: Type: IMPLANTABLE DEVICE | Site: LEFT | Status: FUNCTIONAL

## 2023-12-21 DEVICE — GWIRE TIP STR 0.038INX150CM: Type: IMPLANTABLE DEVICE | Site: LEFT | Status: FUNCTIONAL

## 2023-12-21 DEVICE — GUIDEWIRE SENSOR DUAL-FLEX NITINOL STRAIGHT .035" X 150CM: Type: IMPLANTABLE DEVICE | Site: LEFT | Status: FUNCTIONAL

## 2023-12-21 RX ORDER — SODIUM CHLORIDE 9 MG/ML
1000 INJECTION, SOLUTION INTRAVENOUS
Refills: 0 | Status: DISCONTINUED | OUTPATIENT
Start: 2023-12-21 | End: 2024-01-04

## 2023-12-21 RX ORDER — ONDANSETRON 8 MG/1
4 TABLET, FILM COATED ORAL ONCE
Refills: 0 | Status: DISCONTINUED | OUTPATIENT
Start: 2023-12-21 | End: 2023-12-21

## 2023-12-21 RX ORDER — LIDOCAINE HCL 20 MG/ML
0.2 VIAL (ML) INJECTION ONCE
Refills: 0 | Status: DISCONTINUED | OUTPATIENT
Start: 2023-12-21 | End: 2023-12-21

## 2023-12-21 RX ORDER — FENTANYL CITRATE 50 UG/ML
25 INJECTION INTRAVENOUS
Refills: 0 | Status: DISCONTINUED | OUTPATIENT
Start: 2023-12-21 | End: 2023-12-21

## 2023-12-21 RX ORDER — SODIUM CHLORIDE 9 MG/ML
3 INJECTION INTRAMUSCULAR; INTRAVENOUS; SUBCUTANEOUS EVERY 8 HOURS
Refills: 0 | Status: DISCONTINUED | OUTPATIENT
Start: 2023-12-21 | End: 2023-12-21

## 2023-12-21 RX ORDER — CEFAZOLIN SODIUM 1 G
2000 VIAL (EA) INJECTION ONCE
Refills: 0 | Status: COMPLETED | OUTPATIENT
Start: 2023-12-21 | End: 2023-12-21

## 2023-12-21 NOTE — ASU PATIENT PROFILE, ADULT - AS SC BRADEN SENSORY
STABLE NON-EXUDATIVE AMD: CONTINUE AREDS 2 VITAMINS / AMSLER GRID QD/ UV PROTECTION. SMOKING AVOIDANCE REVIEWED. RETURN FOR FOLLOW-UP AS SCHEDULED. (4) no impairment

## 2023-12-21 NOTE — ASU DISCHARGE PLAN (ADULT/PEDIATRIC) - NURSING INSTRUCTIONS
Please drink  plenty of fluid  If you need pain medication you may take Tylenol, please do not take more then 4000mgs in a 24 hour period Please drink  plenty of fluid  If you need pain medication you may take Tylenol, please do not take more then 4000mgs in a 24 hour period  Please do not take NSAIDS (Motrin, Advil, Aleeve), in preparation for IR procedure next week

## 2023-12-21 NOTE — ASU DISCHARGE PLAN (ADULT/PEDIATRIC) - NS MD DC FALL RISK RISK
For information on Fall & Injury Prevention, visit: [email or web address] OR  [email or web address]-[email or web address]-[email or web address]-injury OR  https://www.cdc.gov/steadi/patient.html For information on Fall & Injury Prevention, visit: [email or web address]://[email or web address]/[email or web address]-[email or web address]-health-[email or web address]-mobility OR  https://www.[email or web address]/news/fall-prevention-tips-to-avoid-injury OR  https://www.cdc.gov/steadi/patient.html

## 2023-12-21 NOTE — ASU DISCHARGE PLAN (ADULT/PEDIATRIC) - PROVIDER TOKENS
PROVIDER:[TOKEN:[[id number]],FOLLOWUP:[1 week],ESTABLISHEDPATIENT:[T]] PROVIDER:[TOKEN:[[id number]],FOLLOWUP:[1 week],ESTABLISHEDPATIENT:[T]]

## 2023-12-21 NOTE — ASU DISCHARGE PLAN (ADULT/PEDIATRIC) - COMMENTS
Dr Miramontes from Interventional Radiology will be reaching out to you to make an appointment for antegrade stent placement

## 2023-12-21 NOTE — ASU PATIENT PROFILE, ADULT - FALL HARM RISK - UNIVERSAL INTERVENTIONS
Bed in lowest position, wheels locked, appropriate side rails in place/Call bell, personal items and telephone in reach/Instruct patient to call for assistance before getting out of bed or chair/Non-slip footwear when patient is out of bed/[place] to call system/Physically safe environment - no spills, clutter or unnecessary equipment/Purposeful Proactive Rounding/Room/bathroom lighting operational, light cord in reach Bed in lowest position, wheels locked, appropriate side rails in place/Call bell, personal items and telephone in reach/Instruct patient to call for assistance before getting out of bed or chair/Non-slip footwear when patient is out of bed/[place] to call system/Physically safe environment - no spills, clutter or unnecessary equipment/Purposeful Proactive Rounding/Room/bathroom lighting operational, light cord in reach

## 2023-12-21 NOTE — ASU DISCHARGE PLAN (ADULT/PEDIATRIC) - CARE PROVIDER_API CALL
Palomo Temple  Urology  [address] [facility], [address]  [place] [address]  Phone: [phone]  Fax: [phone]  Established Patient  Follow Up Time: 1 week   Palomo Temple  Urology  [address] [facility], [address]  [place] [address]  Phone: [phone]  Fax: [phone]  Established Patient  Follow Up Time: 1 week

## 2023-12-22 DIAGNOSIS — N13.9 OBSTRUCTIVE AND REFLUX UROPATHY, UNSPECIFIED: ICD-10-CM

## 2023-12-22 PROBLEM — N20.0 CALCULUS OF KIDNEY: Chronic | Status: ACTIVE | Noted: 2023-12-07

## 2023-12-22 PROBLEM — E03.9 HYPOTHYROIDISM, UNSPECIFIED: Chronic | Status: ACTIVE | Noted: 2023-12-07

## 2023-12-22 PROBLEM — Z85.850 PERSONAL HISTORY OF MALIGNANT NEOPLASM OF THYROID: Chronic | Status: ACTIVE | Noted: 2023-12-07

## 2023-12-22 PROBLEM — Z87.440 PERSONAL HISTORY OF URINARY (TRACT) INFECTIONS: Chronic | Status: ACTIVE | Noted: 2023-12-07

## 2023-12-26 ENCOUNTER — OUTPATIENT (OUTPATIENT)
Dept: OUTPATIENT SERVICES | Facility: HOSPITAL | Age: 38
LOS: 1 days | End: 2023-12-26
Payer: COMMERCIAL

## 2023-12-26 VITALS
HEART RATE: 73 BPM | TEMPERATURE: 98 F | WEIGHT: 102.96 LBS | HEIGHT: 62 IN | RESPIRATION RATE: 18 BRPM | DIASTOLIC BLOOD PRESSURE: 60 MMHG | SYSTOLIC BLOOD PRESSURE: 92 MMHG | OXYGEN SATURATION: 98 %

## 2023-12-26 DIAGNOSIS — N20.0 CALCULUS OF KIDNEY: ICD-10-CM

## 2023-12-26 DIAGNOSIS — Z01.818 ENCOUNTER FOR OTHER PREPROCEDURAL EXAMINATION: ICD-10-CM

## 2023-12-26 DIAGNOSIS — Z98.891 HISTORY OF UTERINE SCAR FROM PREVIOUS SURGERY: Chronic | ICD-10-CM

## 2023-12-26 DIAGNOSIS — Z98.890 OTHER SPECIFIED POSTPROCEDURAL STATES: Chronic | ICD-10-CM

## 2023-12-26 DIAGNOSIS — E89.0 POSTPROCEDURAL HYPOTHYROIDISM: Chronic | ICD-10-CM

## 2023-12-26 LAB
ANION GAP SERPL CALC-SCNC: 11 MMOL/L — SIGNIFICANT CHANGE UP (ref 5–17)
ANION GAP SERPL CALC-SCNC: 11 MMOL/L — SIGNIFICANT CHANGE UP (ref 5–17)
BUN SERPL-MCNC: 16 MG/DL — SIGNIFICANT CHANGE UP (ref 7–23)
BUN SERPL-MCNC: 16 MG/DL — SIGNIFICANT CHANGE UP (ref 7–23)
CALCIUM SERPL-MCNC: 9.4 MG/DL — SIGNIFICANT CHANGE UP (ref 8.4–10.5)
CALCIUM SERPL-MCNC: 9.4 MG/DL — SIGNIFICANT CHANGE UP (ref 8.4–10.5)
CHLORIDE SERPL-SCNC: 99 MMOL/L — SIGNIFICANT CHANGE UP (ref 96–108)
CHLORIDE SERPL-SCNC: 99 MMOL/L — SIGNIFICANT CHANGE UP (ref 96–108)
CO2 SERPL-SCNC: 26 MMOL/L — SIGNIFICANT CHANGE UP (ref 22–31)
CO2 SERPL-SCNC: 26 MMOL/L — SIGNIFICANT CHANGE UP (ref 22–31)
CREAT SERPL-MCNC: 0.53 MG/DL — SIGNIFICANT CHANGE UP (ref 0.5–1.3)
CREAT SERPL-MCNC: 0.53 MG/DL — SIGNIFICANT CHANGE UP (ref 0.5–1.3)
EGFR: 120 ML/MIN/1.73M2 — SIGNIFICANT CHANGE UP
EGFR: 120 ML/MIN/1.73M2 — SIGNIFICANT CHANGE UP
GLUCOSE SERPL-MCNC: 122 MG/DL — HIGH (ref 70–99)
GLUCOSE SERPL-MCNC: 122 MG/DL — HIGH (ref 70–99)
HCT VFR BLD CALC: 35 % — SIGNIFICANT CHANGE UP (ref 34.5–45)
HCT VFR BLD CALC: 35 % — SIGNIFICANT CHANGE UP (ref 34.5–45)
HGB BLD-MCNC: 11.4 G/DL — LOW (ref 11.5–15.5)
HGB BLD-MCNC: 11.4 G/DL — LOW (ref 11.5–15.5)
MCHC RBC-ENTMCNC: 27.8 PG — SIGNIFICANT CHANGE UP (ref 27–34)
MCHC RBC-ENTMCNC: 27.8 PG — SIGNIFICANT CHANGE UP (ref 27–34)
MCHC RBC-ENTMCNC: 32.6 GM/DL — SIGNIFICANT CHANGE UP (ref 32–36)
MCHC RBC-ENTMCNC: 32.6 GM/DL — SIGNIFICANT CHANGE UP (ref 32–36)
MCV RBC AUTO: 85.4 FL — SIGNIFICANT CHANGE UP (ref 80–100)
MCV RBC AUTO: 85.4 FL — SIGNIFICANT CHANGE UP (ref 80–100)
NRBC # BLD: 0 /100 WBCS — SIGNIFICANT CHANGE UP (ref 0–0)
NRBC # BLD: 0 /100 WBCS — SIGNIFICANT CHANGE UP (ref 0–0)
PLATELET # BLD AUTO: 316 K/UL — SIGNIFICANT CHANGE UP (ref 150–400)
PLATELET # BLD AUTO: 316 K/UL — SIGNIFICANT CHANGE UP (ref 150–400)
POTASSIUM SERPL-MCNC: 3.6 MMOL/L — SIGNIFICANT CHANGE UP (ref 3.5–5.3)
POTASSIUM SERPL-MCNC: 3.6 MMOL/L — SIGNIFICANT CHANGE UP (ref 3.5–5.3)
POTASSIUM SERPL-SCNC: 3.6 MMOL/L — SIGNIFICANT CHANGE UP (ref 3.5–5.3)
POTASSIUM SERPL-SCNC: 3.6 MMOL/L — SIGNIFICANT CHANGE UP (ref 3.5–5.3)
RBC # BLD: 4.1 M/UL — SIGNIFICANT CHANGE UP (ref 3.8–5.2)
RBC # BLD: 4.1 M/UL — SIGNIFICANT CHANGE UP (ref 3.8–5.2)
RBC # FLD: 12.4 % — SIGNIFICANT CHANGE UP (ref 10.3–14.5)
RBC # FLD: 12.4 % — SIGNIFICANT CHANGE UP (ref 10.3–14.5)
SODIUM SERPL-SCNC: 136 MMOL/L — SIGNIFICANT CHANGE UP (ref 135–145)
SODIUM SERPL-SCNC: 136 MMOL/L — SIGNIFICANT CHANGE UP (ref 135–145)
WBC # BLD: 5.66 K/UL — SIGNIFICANT CHANGE UP (ref 3.8–10.5)
WBC # BLD: 5.66 K/UL — SIGNIFICANT CHANGE UP (ref 3.8–10.5)
WBC # FLD AUTO: 5.66 K/UL — SIGNIFICANT CHANGE UP (ref 3.8–10.5)
WBC # FLD AUTO: 5.66 K/UL — SIGNIFICANT CHANGE UP (ref 3.8–10.5)

## 2023-12-26 PROCEDURE — 87086 URINE CULTURE/COLONY COUNT: CPT

## 2023-12-26 PROCEDURE — 80048 BASIC METABOLIC PNL TOTAL CA: CPT

## 2023-12-26 PROCEDURE — 85027 COMPLETE CBC AUTOMATED: CPT

## 2023-12-26 PROCEDURE — 36415 COLL VENOUS BLD VENIPUNCTURE: CPT

## 2023-12-26 RX ORDER — SODIUM CHLORIDE 9 MG/ML
3 INJECTION INTRAMUSCULAR; INTRAVENOUS; SUBCUTANEOUS EVERY 8 HOURS
Refills: 0 | Status: DISCONTINUED | OUTPATIENT
Start: 2024-01-08 | End: 2024-01-23

## 2023-12-26 RX ORDER — LIDOCAINE HCL 20 MG/ML
0.2 VIAL (ML) INJECTION ONCE
Refills: 0 | Status: DISCONTINUED | OUTPATIENT
Start: 2024-01-08 | End: 2024-01-23

## 2023-12-26 NOTE — H&P PST ADULT - NSICDXPASTMEDICALHX_GEN_ALL_CORE_FT
PAST MEDICAL HISTORY:  History of thyroid cancer     History of UTI     Hypothyroidism     Renal calculus, left

## 2023-12-26 NOTE — H&P PST ADULT - ASSESSMENT
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

## 2023-12-26 NOTE — H&P PST ADULT - PROBLEM SELECTOR PLAN 1
Cystoscopy, left ureteroscopy, soltive laser, stent insertion with fluoroscopy on 1/8/24 with Dr. Palomo Temple.  Pre-op instructions given. Questions answered.

## 2023-12-26 NOTE — H&P PST ADULT - ATTENDING COMMENTS
PT with left UPJ stone, unable to bypass with wire on initial ureteroscopy attempt. Had subsequent antegrade stent placement by IR. Presents for left ureteroscopy, laser lithotripsy, stent exchange. R/B/A discussed. Imaging reviewed. Consent reviewed and signed. Marked on left.    Palomo Temple MD  [facility]  [address], [place] [address]  Office: [phone] Fax: [phone] PT with left UPJ stone, unable to bypass with wire on initial ureteroscopy attempt. Had subsequent antegrade stent placement by IR. Presents for left ureteroscopy, laser lithotripsy, stent exchange. R/B/A discussed. Imaging reviewed. Consent reviewed and signed. Marked on left.    Palomo Temple MD  [facility]  [address] [facility] [address], [place] [address]  Office: [phone] Fax: [phone]

## 2023-12-26 NOTE — H&P PST ADULT - NSICDXPROCEDURE_GEN_ALL_CORE_FT
PROCEDURES:  Cystoscopy 07-Dec-2023 11:12:13 Cystoscopy, left ureteroscopy, soltive laser, stent insertion w/fluroscopy on 12/21/23. Bishop Farfan

## 2023-12-26 NOTE — H&P PST ADULT - PATIENT ON (OXYGEN DELIVERY METHOD)
Verified Results  VITAMIN D,25 HYDROXY 12Oct2018 11:22AM WON KESSLER   [Oct 12, 2018 9:43PM WON KESSLER]  vit d is normal, you can take 2000 u qd over the winter     Test Name Result Flag Reference   VIT D,25 HYDROXY 86.8 ng/ml  30.0-100.0   <20  ng/mL=Vitamin D deficiency  20-29  ng/mL=Vitamin D insufficiency   ng/mL=Optimal Vitamin D  >150 ng/mL=Possible toxicity       Message   vit d is normal, you can take 2000 u qd over the winter     
room air

## 2023-12-26 NOTE — H&P PST ADULT - HISTORY OF PRESENT ILLNESS
38 year old female  ( S/P section in 2019), PMH of Hypothyroidism  & had left back pain with UTI 10 years ago, s/p UC visit 3 weeks ago with left back pain but discharged sent home & pain was back seen by  PMD with left back pain s/p abdominal sonogram & X-ray reveal having 2 kidney stones in left kidney. S/p urology consult &  Scheduled for Cystoscopy, left ureteroscopy, soltive laser, stent insertion w/ fluoroscopy on 23.   38 year old female presents to PST prior to scheduled Cystoscopy, left ureteroscopy, soltive laser, stent insertion with fluoroscopy on 24 with Dr. Palomo Temple. PMH of thyroid cancer s/p Hypothyroidism,  (2019). Patient c/o of left back pain; had imaging which revealed 2 kidney stones in left kidney approximately 1 month ago. Patient had undergone Cystoscopy, left ureteroscopy, soltive laser, stent insertion w/ fluoroscopy on 23 with Dr. Temple but states "they were unable to get through to the stones and will try a different approach". She endorses intermittent left lower back pain, hematuria and dysuria (unchanged). Denies fever, chills, chest pain, palpitations, sob/dunham, dizziness, syncope.

## 2023-12-29 ENCOUNTER — TRANSCRIPTION ENCOUNTER (OUTPATIENT)
Age: 38
End: 2023-12-29

## 2023-12-29 ENCOUNTER — RESULT REVIEW (OUTPATIENT)
Age: 38
End: 2023-12-29

## 2023-12-29 ENCOUNTER — OUTPATIENT (OUTPATIENT)
Dept: OUTPATIENT SERVICES | Facility: HOSPITAL | Age: 38
LOS: 1 days | End: 2023-12-29
Payer: COMMERCIAL

## 2023-12-29 VITALS
SYSTOLIC BLOOD PRESSURE: 100 MMHG | DIASTOLIC BLOOD PRESSURE: 75 MMHG | OXYGEN SATURATION: 100 % | HEART RATE: 69 BPM | RESPIRATION RATE: 12 BRPM

## 2023-12-29 VITALS
DIASTOLIC BLOOD PRESSURE: 70 MMHG | OXYGEN SATURATION: 100 % | WEIGHT: 102.96 LBS | TEMPERATURE: 98 F | HEART RATE: 71 BPM | SYSTOLIC BLOOD PRESSURE: 94 MMHG | HEIGHT: 62 IN | RESPIRATION RATE: 16 BRPM

## 2023-12-29 DIAGNOSIS — Z98.890 OTHER SPECIFIED POSTPROCEDURAL STATES: Chronic | ICD-10-CM

## 2023-12-29 DIAGNOSIS — N13.9 OBSTRUCTIVE AND REFLUX UROPATHY, UNSPECIFIED: ICD-10-CM

## 2023-12-29 DIAGNOSIS — Z98.891 HISTORY OF UTERINE SCAR FROM PREVIOUS SURGERY: Chronic | ICD-10-CM

## 2023-12-29 DIAGNOSIS — E89.0 POSTPROCEDURAL HYPOTHYROIDISM: Chronic | ICD-10-CM

## 2023-12-29 PROCEDURE — 50694 PLMT URETERAL STENT PRQ: CPT | Mod: LT

## 2023-12-29 RX ORDER — ONDANSETRON 8 MG/1
4 TABLET, FILM COATED ORAL ONCE
Refills: 0 | Status: COMPLETED | OUTPATIENT
Start: 2023-12-29 | End: 2023-12-29

## 2023-12-29 RX ORDER — SODIUM CHLORIDE 9 MG/ML
1000 INJECTION, SOLUTION INTRAVENOUS
Refills: 0 | Status: DISCONTINUED | OUTPATIENT
Start: 2023-12-29 | End: 2024-01-12

## 2023-12-29 RX ORDER — CETIRIZINE HYDROCHLORIDE 10 MG/1
1 TABLET ORAL
Refills: 0 | DISCHARGE

## 2023-12-29 RX ORDER — TAMSULOSIN HYDROCHLORIDE 0.4 MG/1
1 CAPSULE ORAL
Refills: 0 | DISCHARGE

## 2023-12-29 RX ORDER — LEVOTHYROXINE SODIUM 125 MCG
1 TABLET ORAL
Refills: 0 | DISCHARGE

## 2023-12-29 RX ORDER — FENTANYL CITRATE 50 UG/ML
25 INJECTION INTRAVENOUS ONCE
Refills: 0 | Status: DISCONTINUED | OUTPATIENT
Start: 2023-12-29 | End: 2023-12-29

## 2023-12-29 RX ADMIN — ONDANSETRON 4 MILLIGRAM(S): 8 TABLET, FILM COATED ORAL at 14:31

## 2023-12-29 RX ADMIN — FENTANYL CITRATE 25 MICROGRAM(S): 50 INJECTION INTRAVENOUS at 14:25

## 2023-12-29 RX ADMIN — FENTANYL CITRATE 25 MICROGRAM(S): 50 INJECTION INTRAVENOUS at 13:48

## 2023-12-29 NOTE — PRE PROCEDURE NOTE - PRE PROCEDURE EVALUATION
Interventional Radiology    HPI: 38y Female with history of left kidney stones s/p failed attempt of left ureteral stent placement by urology, presenting for left ureteral stent placement, possible left nephrostomy tube placement.     Allergies: No Known Allergies    Medications (Abx/Cardiac/Anticoagulation/Blood Products)      Data:  157.5  46.7  T(C): 36.7  HR: 71  BP: 94/70  RR: 16  SpO2: 100%    Exam  General: No acute distress  Chest: Non labored breathing  Abdomen: Non-distended  Extremities: No swelling, warm    -WBC 5.66 / HgB 11.4 / Hct 35.0 / Plt 316  -Na 136 / Cl 99 / BUN 16 / Glucose 122  -K 3.6 / CO2 26 / Cr 0.53  -ALT -- / Alk Phos -- / T.Bili --    Plan:   38y Female with history of left kidney stones s/p failed attempt of left ureteral stent placement by urology, presenting for left ureteral stent placement, possible left nephrostomy tube placement.   -- Relevant imaging and labs were reviewed.   -- Risks, benefits, and alternatives were explained to the patient and informed consent was obtained.

## 2023-12-29 NOTE — ASU DISCHARGE PLAN (ADULT/PEDIATRIC) - NS MD DC FALL RISK RISK
For information on Fall & Injury Prevention, visit: https://www.[facility]/news/fall-prevention-protects-and-maintains-health-and-mobility OR  https://www.[facility]/news/fall-prevention-tips-to-avoid-injury OR  https://www.cdc.gov/steadi/patient.html For information on Fall & Injury Prevention, visit: https://www.[facility]/news/fall-prevention-protects-and-maintains-health-and-mobility OR  https://www.[facility]/news/fall-prevention-tips-to-avoid-injury OR  https://www.cdc.gov/steadi/patient.html

## 2023-12-29 NOTE — ASU DISCHARGE PLAN (ADULT/PEDIATRIC) - ASU DC SPECIAL INSTRUCTIONSFT
Discharge Instructions  - You have had left ureteral stent placed.  - You may shower in 24 hours. No soaking or swimming until the site is completely healed.  - Keep the area covered and dry for the next 24 hours.  - Do not perform any heavy lifting for the next few days or until the site is healed.  - You may resume your normal diet.  - You may resume your normal medications however you should wait 48 hours before restarting aspirin, plavix, or blood thinners.  - It is normal to experience some pain over the site for the next few days. You may take apply ice to the area (20 minutes on, 20 minutes off) and take Tylenol for that pain. Do not take more frequently than every 6 hours and do not exceed more than 3000mg of Tylenol in a 24 hour period.    - You were given conscious sedation which may make you drowsy, therefore you need someone to stay with you until the morning following the procedure.  - Do not drive, engage in heavy lifting or strenuous activity, or drink any alcoholic beverages for the next 24 hours.   - You may resume normal activity in 24 hours.    Notify your primary physician and/or Interventional Radiology IMMEDIATELY if you experience any of the following       - Fever of 101F or 38C       - Chills or Rigors/ Shakes       - Swelling and/or Redness in the area around the biopsy site       - Worsening Pain       - Blood soaked bandages or worsening bleeding       - Lightheadedness and/or dizziness upon standing       - Chest Pain/ Tightness       - Shortness of Breath       - Difficulty walking    If you have a problem that you believe requires IMMEDIATE attention, please go to your NEAREST Emergency Room. If you believe your problem can safely wait until you speak to a physician, please call Interventional Radiology for any concerns.    During Normal Weekday Business Hours- You can contact the Interventional Radiology department during normal business hours via telephone.  During Evenings and Weekends- If you need to contact Interventional Radiology during off hours, do so by calling the hospital and requesting to be connected to the Interventional Radiologist on call.

## 2023-12-29 NOTE — ASU PATIENT PROFILE, ADULT - FALL HARM RISK - UNIVERSAL INTERVENTIONS
Bed in lowest position, wheels locked, appropriate side rails in place/Call bell, personal items and telephone in reach/Instruct patient to call for assistance before getting out of bed or chair/Non-slip footwear when patient is out of bed/[place] to call system/Physically safe environment - no spills, clutter or unnecessary equipment/Purposeful Proactive Rounding/Room/bathroom lighting operational, light cord in reach Bed in lowest position, wheels locked, appropriate side rails in place/Call bell, personal items and telephone in reach/Instruct patient to call for assistance before getting out of bed or chair/Non-slip footwear when patient is out of bed/[place] to call system/Physically safe environment - no spills, clutter or unnecessary equipment/Purposeful Proactive Rounding/Room/bathroom lighting operational, light cord in reach

## 2023-12-29 NOTE — ASU DISCHARGE PLAN (ADULT/PEDIATRIC) - NURSING INSTRUCTIONS
Please feel free to contact us at [phone] if any problems arise. After 6PM, Monday through Friday, on weekends and on holidays, please call [phone] and ask for the radiology resident on call to be paged. Please feel free to contact us at [phone] if any problems arise. After 6PM, Monday through Friday, on weekends and on holidays, please call [phone] and ask for the radiology resident on call to be paged.

## 2023-12-29 NOTE — PROCEDURE NOTE - PROCEDURE FINDINGS AND DETAILS
Successful image-guided placement of left 8F 26cm double J ureteral stent.  Nephrogram demonstrated an obstructing stone in the proximal ureter with mild to moderate hydronephrosis.   Patient tolerated the procedure well with no immediate complication.  Follow-up with urology as outpatient.

## 2024-01-03 DIAGNOSIS — N13.2 HYDRONEPHROSIS WITH RENAL AND URETERAL CALCULOUS OBSTRUCTION: ICD-10-CM

## 2024-01-03 DIAGNOSIS — Z46.6 ENCOUNTER FOR FITTING AND ADJUSTMENT OF URINARY DEVICE: ICD-10-CM

## 2024-01-07 ENCOUNTER — TRANSCRIPTION ENCOUNTER (OUTPATIENT)
Age: 39
End: 2024-01-07

## 2024-01-08 ENCOUNTER — OUTPATIENT (OUTPATIENT)
Dept: INPATIENT UNIT | Facility: HOSPITAL | Age: 39
LOS: 1 days | End: 2024-01-08
Payer: COMMERCIAL

## 2024-01-08 ENCOUNTER — TRANSCRIPTION ENCOUNTER (OUTPATIENT)
Age: 39
End: 2024-01-08

## 2024-01-08 VITALS
HEART RATE: 68 BPM | SYSTOLIC BLOOD PRESSURE: 93 MMHG | RESPIRATION RATE: 16 BRPM | OXYGEN SATURATION: 96 % | HEIGHT: 62 IN | DIASTOLIC BLOOD PRESSURE: 61 MMHG | TEMPERATURE: 98 F | WEIGHT: 102.96 LBS

## 2024-01-08 VITALS
OXYGEN SATURATION: 100 % | DIASTOLIC BLOOD PRESSURE: 63 MMHG | TEMPERATURE: 98 F | SYSTOLIC BLOOD PRESSURE: 94 MMHG | HEART RATE: 80 BPM | RESPIRATION RATE: 18 BRPM

## 2024-01-08 DIAGNOSIS — N20.0 CALCULUS OF KIDNEY: ICD-10-CM

## 2024-01-08 DIAGNOSIS — N20.1 CALCULUS OF URETER: ICD-10-CM

## 2024-01-08 DIAGNOSIS — E89.0 POSTPROCEDURAL HYPOTHYROIDISM: Chronic | ICD-10-CM

## 2024-01-08 DIAGNOSIS — Z98.891 HISTORY OF UTERINE SCAR FROM PREVIOUS SURGERY: Chronic | ICD-10-CM

## 2024-01-08 DIAGNOSIS — Z98.890 OTHER SPECIFIED POSTPROCEDURAL STATES: Chronic | ICD-10-CM

## 2024-01-08 LAB
HCG UR QL: NEGATIVE — SIGNIFICANT CHANGE UP
HCG UR QL: NEGATIVE — SIGNIFICANT CHANGE UP

## 2024-01-08 PROCEDURE — 86901 BLOOD TYPING SEROLOGIC RH(D): CPT

## 2024-01-08 PROCEDURE — C2617: CPT

## 2024-01-08 PROCEDURE — 82365 CALCULUS SPECTROSCOPY: CPT

## 2024-01-08 PROCEDURE — C1769: CPT

## 2024-01-08 PROCEDURE — 86900 BLOOD TYPING SEROLOGIC ABO: CPT

## 2024-01-08 PROCEDURE — C1894: CPT

## 2024-01-08 PROCEDURE — 52356 CYSTO/URETERO W/LITHOTRIPSY: CPT | Mod: LT

## 2024-01-08 PROCEDURE — 86850 RBC ANTIBODY SCREEN: CPT

## 2024-01-08 PROCEDURE — 88300 SURGICAL PATH GROSS: CPT | Mod: 26

## 2024-01-08 PROCEDURE — C1887: CPT

## 2024-01-08 PROCEDURE — C1758: CPT

## 2024-01-08 PROCEDURE — 50694 PLMT URETERAL STENT PRQ: CPT

## 2024-01-08 PROCEDURE — C1889: CPT

## 2024-01-08 PROCEDURE — 88300 SURGICAL PATH GROSS: CPT

## 2024-01-08 PROCEDURE — 81025 URINE PREGNANCY TEST: CPT

## 2024-01-08 PROCEDURE — 76000 FLUOROSCOPY <1 HR PHYS/QHP: CPT

## 2024-01-08 DEVICE — URETERAL CATH OPEN END 5FR 70CM: Type: IMPLANTABLE DEVICE | Site: LEFT | Status: FUNCTIONAL

## 2024-01-08 DEVICE — URETERAL SHEATH NAVIGATOR HD 11/13FR X 36CM: Type: IMPLANTABLE DEVICE | Site: LEFT | Status: FUNCTIONAL

## 2024-01-08 DEVICE — LASER FIBER SOLTIVE 200 BALL TIP: Type: IMPLANTABLE DEVICE | Site: LEFT | Status: FUNCTIONAL

## 2024-01-08 DEVICE — STENT URET INLAY OPTIMA 6FRX22CM: Type: IMPLANTABLE DEVICE | Site: LEFT | Status: FUNCTIONAL

## 2024-01-08 DEVICE — STONE BASKET ZEROTIP NITINOL 4-WIRE 1.9FR 120CM X 12MM: Type: IMPLANTABLE DEVICE | Site: LEFT | Status: FUNCTIONAL

## 2024-01-08 DEVICE — GUIDEWIRE SENSOR DUAL-FLEX NITINOL STRAIGHT .035" X 150CM: Type: IMPLANTABLE DEVICE | Site: LEFT | Status: FUNCTIONAL

## 2024-01-08 RX ORDER — CEFAZOLIN SODIUM 1 G
2000 VIAL (EA) INJECTION ONCE
Refills: 0 | Status: COMPLETED | OUTPATIENT
Start: 2024-01-08 | End: 2024-01-08

## 2024-01-08 RX ORDER — LEVOTHYROXINE SODIUM 125 MCG
1 TABLET ORAL
Refills: 0 | DISCHARGE

## 2024-01-08 NOTE — ASU DISCHARGE PLAN (ADULT/PEDIATRIC) - ASU DC SPECIAL INSTRUCTIONSFT
You've been sent a three days of cefuroxime to your pharmacy.  Follow up next week for stent removal in office.

## 2024-01-08 NOTE — ASU PATIENT PROFILE, ADULT - FALL HARM RISK - UNIVERSAL INTERVENTIONS
Bed in lowest position, wheels locked, appropriate side rails in place/Call bell, personal items and telephone in reach/Instruct patient to call for assistance before getting out of bed or chair/Non-slip footwear when patient is out of bed/[place] to call system/Physically safe environment - no spills, clutter or unnecessary equipment/Purposeful Proactive Rounding/Room/bathroom lighting operational, light cord in reach Bed in lowest position, wheels locked, appropriate side rails in place/Call bell, personal items and telephone in reach/Instruct patient to call for assistance before getting out of bed or chair/Non-slip footwear when patient is out of bed/[place] to call system/Physically safe environment - no spills, clutter or unnecessary equipment/Purposeful Proactive Rounding/Room/bathroom lighting operational, light cord in reach

## 2024-01-08 NOTE — ASU DISCHARGE PLAN (ADULT/PEDIATRIC) - CARE PROVIDER_API CALL
Palomo Temple  Urology  [address], [address]  [place] [address]  Phone: [phone]  Fax: [phone]  Established Patient  Follow Up Time: 1 week   Palomo Temple  Urology  [address], [address]  [place] [address]  Phone: [phone]  Fax: [phone]  Established Patient  Follow Up Time: 1 week

## 2024-01-08 NOTE — ASU PREOP CHECKLIST - SKIN PREP
Diabetes Self Management Training:    Pt is being seen today by Diana Butler NP who has requested assistance with personal CGM placement.    INTERVENTION:    Patient was able to demonstrate ability to insert and start sensor without difficulty, with minimal assistance.    PLAN:  Per patient request- scheduled CDE follow up to discuss meal planning.     Follow-up:    Follow-up appointment scheduled on 9/26/18 at 10:30.  Pt will start keeping a food log.    Lucía Chávez RD, CDE  Diabetes     Time Spent: 10 minutes  Encounter Type: Individual  No charge visit    Any diabetes medication dose changes were made via the CDE Protocol and Collaborative Practice Agreement with the patient's endocrinology provider. A copy of this encounter was shared with the provider.     n/a

## 2024-01-08 NOTE — ASU DISCHARGE PLAN (ADULT/PEDIATRIC) - PROVIDER TOKENS
PROVIDER:[TOKEN:[[id number]],FOLLOWUP:[1 week],ESTABLISHEDPATIENT:[T]] PROVIDER:[TOKEN:[[id number]],FOLLOWUP:[1 week],ESTABLISHEDPATIENT:[T]]

## 2024-01-08 NOTE — BRIEF OPERATIVE NOTE - NSICDXBRIEFPROCEDURE_GEN_ALL_CORE_FT
PROCEDURES:  Cystoscopy with left ureteroscopy and left-sided laser lithotripsy procedure 08-Jan-2024 16:11:06  Palomo Temple

## 2024-01-08 NOTE — ASU DISCHARGE PLAN (ADULT/PEDIATRIC) - NS MD DC FALL RISK RISK
For information on Fall & Injury Prevention, visit: https://www.[facility]/news/fall-prevention-protects-and-maintains-health-and-mobility OR  https://www.[facility]/news/fall-prevention-tips-to-avoid-injury OR  https://www.cdc.gov/steadi/patient.html For information on Fall & Injury Prevention, visit: https://www.[facility]/news/fall-prevention-protects-and-maintains-health-and-mobility OR  https://www.[facility]/news/fall-prevention-tips-to-avoid-injury OR  https://www.cdc.gov/steadi/patient.html

## 2024-01-12 LAB
CELL MATERIAL STONE EST-MCNT: SIGNIFICANT CHANGE UP
CELL MATERIAL STONE EST-MCNT: SIGNIFICANT CHANGE UP
LABORATORY COMMENT REPORT: SIGNIFICANT CHANGE UP
LABORATORY COMMENT REPORT: SIGNIFICANT CHANGE UP
NIDUS STONE QN: SIGNIFICANT CHANGE UP
NIDUS STONE QN: SIGNIFICANT CHANGE UP
SURGICAL PATHOLOGY STUDY: SIGNIFICANT CHANGE UP
SURGICAL PATHOLOGY STUDY: SIGNIFICANT CHANGE UP

## 2024-05-31 ENCOUNTER — EMERGENCY (EMERGENCY)
Facility: HOSPITAL | Age: 39
LOS: 1 days | Discharge: HOME | End: 2024-05-31
Attending: EMERGENCY MEDICINE
Payer: COMMERCIAL

## 2024-05-31 VITALS
HEART RATE: 67 BPM | RESPIRATION RATE: 18 BRPM | WEIGHT: 100.09 LBS | TEMPERATURE: 98 F | OXYGEN SATURATION: 100 % | HEIGHT: 62 IN | SYSTOLIC BLOOD PRESSURE: 113 MMHG | DIASTOLIC BLOOD PRESSURE: 76 MMHG

## 2024-05-31 VITALS
SYSTOLIC BLOOD PRESSURE: 100 MMHG | OXYGEN SATURATION: 100 % | DIASTOLIC BLOOD PRESSURE: 68 MMHG | RESPIRATION RATE: 16 BRPM | HEART RATE: 66 BPM

## 2024-05-31 DIAGNOSIS — Z98.891 HISTORY OF UTERINE SCAR FROM PREVIOUS SURGERY: Chronic | ICD-10-CM

## 2024-05-31 DIAGNOSIS — Z98.890 OTHER SPECIFIED POSTPROCEDURAL STATES: Chronic | ICD-10-CM

## 2024-05-31 DIAGNOSIS — E89.0 POSTPROCEDURAL HYPOTHYROIDISM: Chronic | ICD-10-CM

## 2024-05-31 PROCEDURE — 99284 EMERGENCY DEPT VISIT MOD MDM: CPT | Mod: 25

## 2024-05-31 PROCEDURE — 71250 CT THORAX DX C-: CPT | Mod: MC

## 2024-05-31 PROCEDURE — 99284 EMERGENCY DEPT VISIT MOD MDM: CPT

## 2024-05-31 PROCEDURE — 70450 CT HEAD/BRAIN W/O DYE: CPT | Mod: 26,MC

## 2024-05-31 PROCEDURE — 71250 CT THORAX DX C-: CPT | Mod: 26,MC

## 2024-05-31 PROCEDURE — 70450 CT HEAD/BRAIN W/O DYE: CPT | Mod: MC

## 2024-05-31 RX ORDER — LIDOCAINE 4 G/100G
1 CREAM TOPICAL ONCE
Refills: 0 | Status: COMPLETED | OUTPATIENT
Start: 2024-05-31 | End: 2024-05-31

## 2024-05-31 RX ORDER — ACETAMINOPHEN 500 MG
650 TABLET ORAL ONCE
Refills: 0 | Status: COMPLETED | OUTPATIENT
Start: 2024-05-31 | End: 2024-05-31

## 2024-05-31 RX ORDER — IBUPROFEN 200 MG
600 TABLET ORAL ONCE
Refills: 0 | Status: COMPLETED | OUTPATIENT
Start: 2024-05-31 | End: 2024-05-31

## 2024-05-31 RX ADMIN — Medication 600 MILLIGRAM(S): at 22:12

## 2024-05-31 RX ADMIN — LIDOCAINE 1 PATCH: 4 CREAM TOPICAL at 20:28

## 2024-05-31 RX ADMIN — Medication 650 MILLIGRAM(S): at 20:27

## 2024-05-31 NOTE — ED PROVIDER NOTE - PHYSICAL EXAMINATION
Cheryle Mckeon DO (PGY1)   Physical Exam:    Gen: NAD, AOx3, non-toxic appearing, able to ambulate without assistance  Lung: CTAB, no respiratory distress, no wheezes/rhonchi/rales B/L  CV: RRR, no murmurs, rubs or gallops. L posterior ribs ttp  MSK: no visible deformities, ROM normal in UE/LE, no midline cervical, thoracic, or lumbar pain   Neuro: CN2-12 grossly intact, A&Ox4, MS +5/5 in UE and LE BL, finger to nose smooth and rapid, gross sensation intact in UE and LE BL, gait smooth and coordinated, negative rhomberg, negative pronator drift

## 2024-05-31 NOTE — ED ADULT NURSE NOTE - OBJECTIVE STATEMENT
38 y female presents s/p fall down stairs. Pt states she was walking down wood stairs and slipped, sliding down 7 steps. Pt states she landed on her left side and hit the left side of her head. Pt denies LOC, AC use. Pt denies nausea, vomiting, blurry vision. Pt complaining of left sided head pain and left sided rib pain. Pt is ambulatory.

## 2024-05-31 NOTE — ED ADULT TRIAGE NOTE - CHIEF COMPLAINT QUOTE
fall down approx 7 steps, pt c/o head pain and left sided rib pain  positive head strike, no LOC, no a/c

## 2024-05-31 NOTE — ED PROVIDER NOTE - ATTENDING CONTRIBUTION TO CARE
Patient is a 38-year-old female with no past medical history arrives status post slide down 7 stairs on her back hit her head with a small hematoma left occipital no C-spine tenderness also complaining of left-sided rib pain left posterior lateral with small ecchymotic region around C6 6 or 7 no abdominal pain mild pain to the left elbow with a hematoma to the olecranon but full range of motion x-rays of the hand CT head and chest were ordered analgesia reassess

## 2024-05-31 NOTE — ED PROVIDER NOTE - NSFOLLOWUPINSTRUCTIONS_ED_ALL_ED_FT
You came to the emergency department for headache and rib pain after fall.  We performed imaging which was normal.    Please take ibuprofen and Tylenol together every 6-8 hours as needed for pain.  You can buy lidocaine patches at the store and apply them as needed for pain.    Please return to the emergency department if you have new or worsening pain or if you otherwise feel concerned.

## 2024-05-31 NOTE — ED PROVIDER NOTE - OBJECTIVE STATEMENT
38-year-old female no PMH presents for headache and left-sided posterior rib.  Began after she tripped and fell down several stairs and struck her head and left ribs.  Denies LOC, AC use, nausea, vomiting, numbness, tingling, unilateral weakness, vision changes, or any other symptoms at this time. 38-year-old female no PMH presents for headache and left-sided posterior rib.  Began after she tripped and fell down several stairs and struck her head and left ribs.  Denies LOC, AC use, nausea, vomiting, numbness, tingling, unilateral weakness, vision changes, or any other symptoms at this time. Mike PGY1

## 2024-05-31 NOTE — ED ADULT TRIAGE NOTE - PATIENT'S PREFERRED PRONOUN
Her/She normal... Well appearing, well nourished, awake, alert, oriented to person, place, time/situation and in no apparent distress.

## 2024-05-31 NOTE — ED PROVIDER NOTE - PATIENT PORTAL LINK FT
You can access the FollowMyHealth Patient Portal offered by [facility] by registering at the following website: [email or web address]. By joining [facility]’s FollowMyHealth portal, you will also be able to view your health information using other applications (apps) compatible with our system.

## 2024-05-31 NOTE — ED ADULT NURSE NOTE - NSFALLRISKINTERV_ED_ALL_ED

## 2024-07-26 NOTE — PRE-ANESTHESIA EVALUATION ADULT - HEIGHT IN CM
Physician Progress Note      PATIENT:               CECI WIGGINS  CSN #:                  [id number]  :                       1966  ADMIT DATE:       2024 12:57 PM  DISCH DATE:        2024 5:26 PM  RESPONDING  PROVIDER #:        Tenisha Perez MD          QUERY TEXT:    Pt admitted with abdominal wall cellulitis/ abscess, noted to have WBC 17.9 on   admission with HR 91 x1 in ED. If possible, please document in the progress   notes and discharge summary if you are evaluating and /or treating any of the   following:    The medical record reflects the following:  Risk Factors: acute illness  Clinical Indicators: admitted with abdominal wall cellulitis/ abscess  - WBC 17.9 on admission with HR 91 x1 in ED  - DCS notes SIRS  Treatment: NS bolus (1L), Zosyn IV, Vanc IV, I&D, discharged on Bactrim DS   tablet 2 times daily for 14 days    Thank you,    Ginger Devine RN  CDI  Options provided:  -- Sepsis, present on admission  -- Abdominal wall cellulitis/ abscess without sepsis  -- Sepsis was ruled out  -- Other - I will add my own diagnosis  -- Disagree - Not applicable / Not valid  -- Disagree - Clinically unable to determine / Unknown  -- Refer to Clinical Documentation Reviewer    PROVIDER RESPONSE TEXT:    This patient has abdominal wall cellulitis/ abscess without sepsis.    Query created by: Ginger Devine on 2024 9:32 AM      Electronically signed by:  Tenisha Perez MD 2024 9:25   AM           157.48

## 2025-07-17 NOTE — H&P PST ADULT - PAIN SCORE
Airway  Date/Time: 7/17/2025 8:18 AM  Reason: elective      Staffing  Performed: CRNA   Authorized by: RENEE Ashley    Performed by: RENEE Ashley  Patient location during procedure: OR    Patient Condition  Indications for airway management: anesthesia  Patient position: sniffing  Sedation level: deep     Final Airway Details   Preoxygenated: yes  Final airway type: endotracheal airway  Successful airway: ETT  Cuffed: yes   Successful intubation technique: direct laryngoscopy  Blade: Reshma  Blade size: #1  ETT size (mm): 5.0  Cormack-Lehane Classification: grade I - full view of glottis  Placement verified by: chest auscultation   Measured from: lips  ETT to lips (cm): 16  Number of attempts at approach: 1           0

## (undated) DEVICE — SOL IRR POUR H2O 1500ML

## (undated) DEVICE — WARMING BLANKET UPPER ADULT

## (undated) DEVICE — ADAPTER CHECK FLO 9FR STERILE

## (undated) DEVICE — GOWN TRIMAX LG

## (undated) DEVICE — BOSTON SCIENTIFC PUMPING SYSTEM SAPS SINGLE ACTION 10CC

## (undated) DEVICE — SYR ASEPTO

## (undated) DEVICE — POSITIONER FOAM HEADREST (PINK)

## (undated) DEVICE — IRR BULB PATHFINDER + 10"

## (undated) DEVICE — POSITIONER FOAM EGG CRATE ULNAR 2PCS (PINK)

## (undated) DEVICE — PACK CYSTO

## (undated) DEVICE — ACMI SELF-SEALING SEAL UP TO 7FR

## (undated) DEVICE — SOL IRR BAG NS 0.9% 3000ML

## (undated) DEVICE — TUBING RANGER FLUID IRRIGATION SET DISP

## (undated) DEVICE — FOLEY HOLDER STATLOCK 2 WAY ADULT

## (undated) DEVICE — GLV 7.5 PROTEXIS (WHITE)

## (undated) DEVICE — GLV 8 PROTEXIS (WHITE)

## (undated) DEVICE — GLV 6.5 PROTEXIS (WHITE)

## (undated) DEVICE — VENODYNE/SCD SLEEVE CALF LARGE

## (undated) DEVICE — PRESSURE INFUSOR BAG 3000ML

## (undated) DEVICE — TUBING SUCTION 20FT

## (undated) DEVICE — DRAPE EQUIPMENT BANDED BAG 30 X 30" (SHOWER CAP)

## (undated) DEVICE — SOL IRR BAG H2O 3000ML

## (undated) DEVICE — GLV 7 PROTEXIS (WHITE)

## (undated) DEVICE — DRAPE COVER DOME COVEX 27"